# Patient Record
Sex: FEMALE | Race: BLACK OR AFRICAN AMERICAN | Employment: FULL TIME | URBAN - METROPOLITAN AREA
[De-identification: names, ages, dates, MRNs, and addresses within clinical notes are randomized per-mention and may not be internally consistent; named-entity substitution may affect disease eponyms.]

---

## 2017-02-13 ENCOUNTER — OFFICE VISIT (OUTPATIENT)
Dept: OBGYN CLINIC | Facility: CLINIC | Age: 57
End: 2017-02-13

## 2017-02-13 VITALS
HEIGHT: 67 IN | DIASTOLIC BLOOD PRESSURE: 70 MMHG | SYSTOLIC BLOOD PRESSURE: 108 MMHG | RESPIRATION RATE: 16 BRPM | HEART RATE: 75 BPM | BODY MASS INDEX: 30.45 KG/M2 | WEIGHT: 194 LBS | TEMPERATURE: 99 F

## 2017-02-13 DIAGNOSIS — M75.81 ROTATOR CUFF TENDINITIS, RIGHT: ICD-10-CM

## 2017-02-13 DIAGNOSIS — Z00.01 ENCOUNTER FOR PREVENTATIVE ADULT HEALTH CARE EXAM WITH ABNORMAL FINDINGS: Primary | ICD-10-CM

## 2017-02-13 DIAGNOSIS — Z12.31 SCREENING MAMMOGRAM, ENCOUNTER FOR: ICD-10-CM

## 2017-02-13 DIAGNOSIS — Z23 NEED FOR VACCINATION: ICD-10-CM

## 2017-02-13 DIAGNOSIS — E66.09 NON MORBID OBESITY DUE TO EXCESS CALORIES: ICD-10-CM

## 2017-02-13 PROBLEM — N87.0 CERVICAL INTRAEPITHELIAL NEOPLASIA GRADE 1: Status: ACTIVE | Noted: 2017-02-13

## 2017-02-13 PROCEDURE — 90686 IIV4 VACC NO PRSV 0.5 ML IM: CPT | Performed by: FAMILY MEDICINE

## 2017-02-13 PROCEDURE — 99386 PREV VISIT NEW AGE 40-64: CPT | Performed by: FAMILY MEDICINE

## 2017-02-13 PROCEDURE — 90471 IMMUNIZATION ADMIN: CPT | Performed by: FAMILY MEDICINE

## 2017-02-13 PROCEDURE — 90715 TDAP VACCINE 7 YRS/> IM: CPT | Performed by: FAMILY MEDICINE

## 2017-02-13 PROCEDURE — 90472 IMMUNIZATION ADMIN EACH ADD: CPT | Performed by: FAMILY MEDICINE

## 2017-02-13 NOTE — PROGRESS NOTES
HPI:   Deanne Menendez is a 61year old female who presents for a complete physical exam.     Notes right shoulder pain when reaching for things behind for past 3 months. No hx of any injuries in the past per the patient. No hx of grinding or locking.      Angela Hernadez Hypertension Mother    • Stroke Daughter       Social History:     Smoking Status: Never Smoker                      Alcohol Use: No              Occ: [address] : .  Children: 4.  Smoking status: none, Alcohol [04265]    Discussed with patient the following:  -Monthly breast self-exam  -Breast cancer screening/mammograms and clinical breast exams  -Cervical cancer screening/pap smears-to be done with gynecology  -Colon cancer screening/colonoscopy-up to date, ne

## 2017-02-13 NOTE — PATIENT INSTRUCTIONS
Prevention Guidelines, Women Ages 48 to 59  Screening tests and vaccines are an important part of managing your health. Health counseling is essential, too. Below are guidelines for these, for women ages 48 to 59.  Talk with your healthcare provider to ma Lung cancer Adults age 54 to [de-identified] who have smoked Yearly screening in smokers with 30 pack-year history of smoking or who quit within 15 years   Obesity All women in this age group At routine exams   Osteoporosis Women who are postmenopausal Ask your healthc PPSV23: 1 to 2 doses through age 59, or 1 dose at 72 or older (protects against 23 types of pneumococcal bacteria)   Tetanus/diphtheria/pertussis (Td/Tdap) booster All women in this age group Td every 10 years, or a one-time dose of Tdap instead of a Td cindy

## 2017-02-20 ENCOUNTER — APPOINTMENT (OUTPATIENT)
Dept: LAB | Age: 57
End: 2017-02-20
Attending: FAMILY MEDICINE
Payer: COMMERCIAL

## 2017-02-20 DIAGNOSIS — Z00.01 ENCOUNTER FOR PREVENTATIVE ADULT HEALTH CARE EXAM WITH ABNORMAL FINDINGS: ICD-10-CM

## 2017-02-20 DIAGNOSIS — E66.09 NON MORBID OBESITY DUE TO EXCESS CALORIES: ICD-10-CM

## 2017-02-20 LAB
CHOLEST SERPL-MCNC: 169 MG/DL (ref 110–200)
HBA1C MFR BLD: 5.7 % (ref 4–6)
HDLC SERPL-MCNC: 58 MG/DL
LDLC SERPL CALC-MCNC: 99 MG/DL (ref 0–99)
NONHDLC SERPL-MCNC: 111 MG/DL
TRIGL SERPL-MCNC: 62 MG/DL (ref 1–149)

## 2017-02-20 PROCEDURE — 86803 HEPATITIS C AB TEST: CPT

## 2017-02-20 PROCEDURE — 83036 HEMOGLOBIN GLYCOSYLATED A1C: CPT

## 2017-02-20 PROCEDURE — 36415 COLL VENOUS BLD VENIPUNCTURE: CPT

## 2017-02-20 PROCEDURE — 80061 LIPID PANEL: CPT

## 2017-02-21 LAB — HCV AB SERPL QL IA: NONREACTIVE

## 2017-02-27 ENCOUNTER — HOSPITAL ENCOUNTER (OUTPATIENT)
Dept: MAMMOGRAPHY | Age: 57
Discharge: HOME | End: 2017-02-27
Attending: FAMILY MEDICINE
Payer: COMMERCIAL

## 2017-02-27 DIAGNOSIS — Z12.31 SCREENING MAMMOGRAM, ENCOUNTER FOR: ICD-10-CM

## 2017-02-27 PROCEDURE — 77067 SCR MAMMO BI INCL CAD: CPT

## 2017-03-08 ENCOUNTER — TELEPHONE (OUTPATIENT)
Dept: OBGYN CLINIC | Facility: CLINIC | Age: 57
End: 2017-03-08

## 2017-03-08 DIAGNOSIS — H57.11 ACUTE RIGHT EYE PAIN: Primary | ICD-10-CM

## 2017-03-08 NOTE — TELEPHONE ENCOUNTER
Patient reports acute onset right eye pain after removing her contact due to severe allergy symptoms in her eye. Describes foreign body sensation and no relief after using Visine and flushing with saline. Also can not see anything in her eye.  No sudden vis

## 2017-07-15 ENCOUNTER — MED REC SCAN ONLY (OUTPATIENT)
Dept: FAMILY MEDICINE CLINIC | Facility: CLINIC | Age: 57
End: 2017-07-15

## 2017-10-31 ENCOUNTER — TELEPHONE (OUTPATIENT)
Dept: OBGYN CLINIC | Facility: CLINIC | Age: 57
End: 2017-10-31

## 2017-10-31 DIAGNOSIS — T75.3XXD MOTION SICKNESS, SUBSEQUENT ENCOUNTER: Primary | ICD-10-CM

## 2017-10-31 RX ORDER — SCOLOPAMINE TRANSDERMAL SYSTEM 1 MG/1
1 PATCH, EXTENDED RELEASE TRANSDERMAL
Qty: 7 PATCH | Refills: 0 | Status: SHIPPED | OUTPATIENT
Start: 2017-10-31 | End: 2018-06-04 | Stop reason: ALTCHOICE

## 2017-10-31 NOTE — TELEPHONE ENCOUNTER
Pt states she will be going on a cruise and usually gets motion sickness and want to know if she can get an rx for a patch to her her unsure of name

## 2018-06-04 ENCOUNTER — OFFICE VISIT (OUTPATIENT)
Dept: OBGYN CLINIC | Facility: CLINIC | Age: 58
End: 2018-06-04

## 2018-06-04 ENCOUNTER — HOSPITAL ENCOUNTER (OUTPATIENT)
Dept: GENERAL RADIOLOGY | Age: 58
Discharge: HOME | End: 2018-06-04
Attending: FAMILY MEDICINE
Payer: COMMERCIAL

## 2018-06-04 ENCOUNTER — OFFICE VISIT (OUTPATIENT)
Dept: FAMILY MEDICINE CLINIC | Facility: CLINIC | Age: 58
End: 2018-06-04

## 2018-06-04 ENCOUNTER — APPOINTMENT (OUTPATIENT)
Dept: LAB | Facility: REFERENCE LAB | Age: 58
End: 2018-06-04
Attending: FAMILY MEDICINE
Payer: COMMERCIAL

## 2018-06-04 VITALS
HEIGHT: 66 IN | BODY MASS INDEX: 30.37 KG/M2 | WEIGHT: 189 LBS | OXYGEN SATURATION: 98 % | SYSTOLIC BLOOD PRESSURE: 112 MMHG | HEART RATE: 84 BPM | DIASTOLIC BLOOD PRESSURE: 68 MMHG

## 2018-06-04 VITALS
WEIGHT: 189.81 LBS | DIASTOLIC BLOOD PRESSURE: 86 MMHG | BODY MASS INDEX: 30.51 KG/M2 | SYSTOLIC BLOOD PRESSURE: 120 MMHG | HEIGHT: 66 IN

## 2018-06-04 DIAGNOSIS — M25.552 BILATERAL HIP PAIN: ICD-10-CM

## 2018-06-04 DIAGNOSIS — Z00.01 ENCOUNTER FOR ROUTINE ADULT HEALTH EXAMINATION WITH ABNORMAL FINDINGS: ICD-10-CM

## 2018-06-04 DIAGNOSIS — Z01.419 WOMEN'S ANNUAL ROUTINE GYNECOLOGICAL EXAMINATION: Primary | ICD-10-CM

## 2018-06-04 DIAGNOSIS — Z00.01 ENCOUNTER FOR ROUTINE ADULT HEALTH EXAMINATION WITH ABNORMAL FINDINGS: Primary | ICD-10-CM

## 2018-06-04 DIAGNOSIS — M25.551 BILATERAL HIP PAIN: ICD-10-CM

## 2018-06-04 DIAGNOSIS — Z12.4 ROUTINE CERVICAL SMEAR: ICD-10-CM

## 2018-06-04 DIAGNOSIS — Z12.11 COLON CANCER SCREENING: ICD-10-CM

## 2018-06-04 PROCEDURE — 99396 PREV VISIT EST AGE 40-64: CPT | Performed by: OBSTETRICS & GYNECOLOGY

## 2018-06-04 PROCEDURE — 88175 CYTOPATH C/V AUTO FLUID REDO: CPT | Performed by: OBSTETRICS & GYNECOLOGY

## 2018-06-04 PROCEDURE — 87624 HPV HI-RISK TYP POOLED RSLT: CPT | Performed by: OBSTETRICS & GYNECOLOGY

## 2018-06-04 PROCEDURE — 99396 PREV VISIT EST AGE 40-64: CPT | Performed by: FAMILY MEDICINE

## 2018-06-04 PROCEDURE — 36415 COLL VENOUS BLD VENIPUNCTURE: CPT

## 2018-06-04 PROCEDURE — 73522 X-RAY EXAM HIPS BI 3-4 VIEWS: CPT | Performed by: FAMILY MEDICINE

## 2018-06-04 PROCEDURE — 83036 HEMOGLOBIN GLYCOSYLATED A1C: CPT

## 2018-06-04 RX ORDER — IBUPROFEN 200 MG
400 TABLET ORAL EVERY 6 HOURS PRN
Status: ON HOLD | COMMUNITY
End: 2020-12-31

## 2018-06-04 NOTE — PROGRESS NOTES
GYN ANNUAL    2018  1:45 PM    CC:Patient presents for yearly visit    HPI: Patient is a 60year old  LMP  here for annual gyn exam, due for PAP, mammogram and due for colonoscopy. No pelvic pain. No abnormal vaginal discharge or bleeding. Drug use: No    Sexual activity: Not on file     Other Topics Concern   None on file     Social History Narrative   None on file       ROS:     Review of Systems:  General: no complaints  Eyes: no complaints  Respiratory: no complaints  Cardiovascular:

## 2018-06-04 NOTE — PROGRESS NOTES
HPI:   Dearl Asa Torres is a 60year old female who presents for a complete physical exam.     Has had pain in both hips down to her knees for the past year. Takes Ibuprofen when the pain is severe.  Sometimes she thinks sleep positions make it worse but no cervix (lgsil) 2010   • Post-menopausal 2012   • Post-menopausal bleeding 2015      Past Surgical History:  2015: COLPOSCOPY, CERVIX W/UPPER ADJACENT VAGINA; W/*  2012: COLPOSCOPY, CERVIX W/UPPER ADJACENT VAGINA; W/*  08/13/2015: HYSTEROSCOPY      Comment: complete physical exam.  Encounter for routine adult health examination with abnormal findings  (primary encounter diagnosis)  Bilateral hip pain    Orders Placed This Encounter      Hemoglobin A1C (Glycohemoglobin) [E]    Check bilateral hip x-ray for anya

## 2018-06-05 DIAGNOSIS — M16.0 OSTEOARTHRITIS OF BOTH HIPS, UNSPECIFIED OSTEOARTHRITIS TYPE: Primary | ICD-10-CM

## 2018-07-23 ENCOUNTER — HOSPITAL ENCOUNTER (OUTPATIENT)
Dept: MAMMOGRAPHY | Age: 58
Discharge: HOME | End: 2018-07-23
Attending: OBSTETRICS & GYNECOLOGY
Payer: COMMERCIAL

## 2018-07-23 DIAGNOSIS — Z01.419 WOMEN'S ANNUAL ROUTINE GYNECOLOGICAL EXAMINATION: ICD-10-CM

## 2018-07-23 PROCEDURE — 77067 SCR MAMMO BI INCL CAD: CPT | Performed by: OBSTETRICS & GYNECOLOGY

## 2018-07-23 PROCEDURE — 77063 BREAST TOMOSYNTHESIS BI: CPT | Performed by: OBSTETRICS & GYNECOLOGY

## 2018-08-14 ENCOUNTER — TELEPHONE (OUTPATIENT)
Dept: FAMILY MEDICINE CLINIC | Facility: CLINIC | Age: 58
End: 2018-08-14

## 2018-08-14 NOTE — TELEPHONE ENCOUNTER
Patient needing Orthopedic referral sent to [facility] office  Fax over [phone] Also patient needing Copy of Xray pictures to bring with to her appointment

## 2018-08-14 NOTE — TELEPHONE ENCOUNTER
Informed pt that her referral to Dr. Malgorzata Osuna is in her chart and Dr. Chelo Simpson office can retrieve that from her chart. Let pt know that a copy of her CD would need to be signed for by the pt and given to Dr. Malgorzata Osuna. We can not request that.  Gave pt # for radio

## 2018-10-03 ENCOUNTER — HOSPITAL ENCOUNTER (OUTPATIENT)
Dept: MRI IMAGING | Facility: HOSPITAL | Age: 58
Discharge: HOME | End: 2018-10-03
Attending: PHYSICIAN ASSISTANT
Payer: COMMERCIAL

## 2018-10-03 DIAGNOSIS — M87.059: ICD-10-CM

## 2018-10-03 DIAGNOSIS — M16.11 OSTEOARTHRITIS OF RIGHT HIP: ICD-10-CM

## 2018-10-03 DIAGNOSIS — M25.551 RIGHT HIP PAIN: ICD-10-CM

## 2018-10-03 PROCEDURE — 73721 MRI JNT OF LWR EXTRE W/O DYE: CPT | Performed by: PHYSICIAN ASSISTANT

## 2019-03-13 ENCOUNTER — TELEPHONE (OUTPATIENT)
Dept: FAMILY MEDICINE CLINIC | Facility: CLINIC | Age: 59
End: 2019-03-13

## 2019-11-11 ENCOUNTER — OFFICE VISIT (OUTPATIENT)
Dept: FAMILY MEDICINE CLINIC | Facility: CLINIC | Age: 59
End: 2019-11-11

## 2019-11-11 VITALS
SYSTOLIC BLOOD PRESSURE: 126 MMHG | OXYGEN SATURATION: 98 % | HEIGHT: 66 IN | WEIGHT: 192 LBS | HEART RATE: 76 BPM | BODY MASS INDEX: 30.86 KG/M2 | DIASTOLIC BLOOD PRESSURE: 80 MMHG

## 2019-11-11 DIAGNOSIS — M16.0 OSTEOARTHRITIS OF BOTH HIPS, UNSPECIFIED OSTEOARTHRITIS TYPE: ICD-10-CM

## 2019-11-11 DIAGNOSIS — Z12.31 SCREENING MAMMOGRAM, ENCOUNTER FOR: ICD-10-CM

## 2019-11-11 DIAGNOSIS — Z23 NEED FOR VACCINATION: ICD-10-CM

## 2019-11-11 DIAGNOSIS — R10.2 SUPRAPUBIC PAIN, ACUTE: ICD-10-CM

## 2019-11-11 DIAGNOSIS — Z12.11 COLON CANCER SCREENING: ICD-10-CM

## 2019-11-11 DIAGNOSIS — Z00.01 ENCOUNTER FOR ROUTINE ADULT HEALTH EXAMINATION WITH ABNORMAL FINDINGS: Primary | ICD-10-CM

## 2019-11-11 PROCEDURE — 90686 IIV4 VACC NO PRSV 0.5 ML IM: CPT | Performed by: FAMILY MEDICINE

## 2019-11-11 PROCEDURE — 99396 PREV VISIT EST AGE 40-64: CPT | Performed by: FAMILY MEDICINE

## 2019-11-11 PROCEDURE — 90471 IMMUNIZATION ADMIN: CPT | Performed by: FAMILY MEDICINE

## 2019-11-11 RX ORDER — SCOLOPAMINE TRANSDERMAL SYSTEM 1 MG/1
1 PATCH, EXTENDED RELEASE TRANSDERMAL
Qty: 4 PATCH | Refills: 0 | Status: SHIPPED | OUTPATIENT
Start: 2019-11-11 | End: 2019-12-10

## 2019-11-11 NOTE — PROGRESS NOTES
HPI:   Socorro Torres is a 60year old female who presents for a complete physical exam.     Has been having RLQ pain for the past week that is tender to the touch and uncertain if it is due to her hip pain. States walking makes the pain hurt more.   Had MCG/0.5ML Intramuscular Recon Susp Inject 50 mcg into the muscle every 3 (three) months for 2 doses. 1 each 1   • ibuprofen 200 MG Oral Tab Take 400 mg by mouth every 6 (six) hours as needed for Pain.        No Known Allergies   Past Medical History:   Diag or HSM   : Deferred, sees gynecology    EXTREMITIES: no edema  MSK: Normal gait, bilateral log roll testing normal/negative     Cholesterol, Total (mg/dL)   Date Value   02/20/2017 169     HDL Cholesterol (mg/dL)   Date Value   02/20/2017 58     LDL Chol for Tdap once as an adult and Td booster every 10 years  -Need for Zoster vaccine for patients >= 50  -STI screening (GC/Chlamydia/HIV): Not indicated   -Hepatitis C screening for those born between [facility] or high risk: Checked and negative     All

## 2019-11-13 ENCOUNTER — LAB ENCOUNTER (OUTPATIENT)
Dept: LAB | Facility: REFERENCE LAB | Age: 59
End: 2019-11-13
Attending: FAMILY MEDICINE
Payer: COMMERCIAL

## 2019-11-13 DIAGNOSIS — Z00.01 ENCOUNTER FOR ROUTINE ADULT HEALTH EXAMINATION WITH ABNORMAL FINDINGS: ICD-10-CM

## 2019-11-13 PROCEDURE — 80061 LIPID PANEL: CPT

## 2019-11-13 PROCEDURE — 83036 HEMOGLOBIN GLYCOSYLATED A1C: CPT

## 2019-11-13 PROCEDURE — 85025 COMPLETE CBC W/AUTO DIFF WBC: CPT

## 2019-11-13 PROCEDURE — 36415 COLL VENOUS BLD VENIPUNCTURE: CPT

## 2019-11-13 PROCEDURE — 80053 COMPREHEN METABOLIC PANEL: CPT

## 2019-11-18 ENCOUNTER — OFFICE VISIT (OUTPATIENT)
Dept: OBGYN CLINIC | Facility: CLINIC | Age: 59
End: 2019-11-18

## 2019-11-18 VITALS
WEIGHT: 188.5 LBS | HEIGHT: 66 IN | BODY MASS INDEX: 30.29 KG/M2 | DIASTOLIC BLOOD PRESSURE: 80 MMHG | SYSTOLIC BLOOD PRESSURE: 110 MMHG

## 2019-11-18 DIAGNOSIS — Z01.419 WOMEN'S ANNUAL ROUTINE GYNECOLOGICAL EXAMINATION: Primary | ICD-10-CM

## 2019-11-18 DIAGNOSIS — R10.30 LOWER ABDOMINAL PAIN: ICD-10-CM

## 2019-11-18 PROCEDURE — 99214 OFFICE O/P EST MOD 30 MIN: CPT | Performed by: OBSTETRICS & GYNECOLOGY

## 2019-11-18 PROCEDURE — 99396 PREV VISIT EST AGE 40-64: CPT | Performed by: OBSTETRICS & GYNECOLOGY

## 2019-11-18 RX ORDER — ACETAMINOPHEN 500 MG
500 TABLET ORAL EVERY 6 HOURS PRN
Status: ON HOLD | COMMUNITY
End: 2020-12-31

## 2019-11-18 NOTE — PROGRESS NOTES
GYN ANNUAL    11/18/2019  8:49 AM    Patient presents with: Annual: annual exam recent physical exam done by Dr Davi Hazel 11/11/2019  Abdominal Pain: pt c/o lower abdominal pain   .     HPI: Patient is a 60year old [id number] LMP 2012 presents for annual gyn exam Place 1 patch onto the skin every third day.  4 patch 0       Past Medical History:   Diagnosis Date   • Fractured bone 2008    Tail bone    • Low grade squamous intraepith lesion on cytologic smear cervix (lgsil) 2010   • Post-menopausal 2012   • Post-joann in no apparent distress  SKIN: no rashes, no lesions  HEENT: normal  LUNGS: respiration unlabored  CARDIOVASCULAR: no peripheral edema or varicosities, skin warm and dry  BREASTS: bilaterally nontender, no palpable masses, no nipple discharge, no skin thakur

## 2019-11-19 ENCOUNTER — TELEPHONE (OUTPATIENT)
Dept: OBGYN CLINIC | Facility: CLINIC | Age: 59
End: 2019-11-19

## 2019-11-19 ENCOUNTER — HOSPITAL ENCOUNTER (OUTPATIENT)
Age: 59
Discharge: HOME | End: 2019-11-19
Attending: EMERGENCY MEDICINE
Payer: COMMERCIAL

## 2019-11-19 ENCOUNTER — APPOINTMENT (OUTPATIENT)
Dept: ULTRASOUND IMAGING | Facility: HOSPITAL | Age: 59
End: 2019-11-19
Attending: EMERGENCY MEDICINE
Payer: COMMERCIAL

## 2019-11-19 ENCOUNTER — APPOINTMENT (OUTPATIENT)
Dept: CT IMAGING | Facility: HOSPITAL | Age: 59
End: 2019-11-19
Attending: EMERGENCY MEDICINE
Payer: COMMERCIAL

## 2019-11-19 ENCOUNTER — HOSPITAL ENCOUNTER (EMERGENCY)
Facility: HOSPITAL | Age: 59
Discharge: HOME | End: 2019-11-19
Attending: EMERGENCY MEDICINE
Payer: COMMERCIAL

## 2019-11-19 VITALS
WEIGHT: 182 LBS | RESPIRATION RATE: 16 BRPM | OXYGEN SATURATION: 99 % | BODY MASS INDEX: 29.25 KG/M2 | TEMPERATURE: 98 F | DIASTOLIC BLOOD PRESSURE: 80 MMHG | SYSTOLIC BLOOD PRESSURE: 125 MMHG | HEART RATE: 90 BPM | HEIGHT: 66 IN

## 2019-11-19 VITALS
SYSTOLIC BLOOD PRESSURE: 124 MMHG | HEART RATE: 85 BPM | TEMPERATURE: 97 F | OXYGEN SATURATION: 100 % | DIASTOLIC BLOOD PRESSURE: 91 MMHG | RESPIRATION RATE: 18 BRPM

## 2019-11-19 DIAGNOSIS — R10.2 PELVIC PAIN IN FEMALE: Primary | ICD-10-CM

## 2019-11-19 DIAGNOSIS — R10.2 PELVIC PAIN: Primary | ICD-10-CM

## 2019-11-19 PROCEDURE — 99202 OFFICE O/P NEW SF 15 MIN: CPT

## 2019-11-19 PROCEDURE — 99213 OFFICE O/P EST LOW 20 MIN: CPT

## 2019-11-19 PROCEDURE — 76830 TRANSVAGINAL US NON-OB: CPT | Performed by: EMERGENCY MEDICINE

## 2019-11-19 PROCEDURE — 93975 VASCULAR STUDY: CPT | Performed by: EMERGENCY MEDICINE

## 2019-11-19 PROCEDURE — 80048 BASIC METABOLIC PNL TOTAL CA: CPT | Performed by: EMERGENCY MEDICINE

## 2019-11-19 PROCEDURE — 76856 US EXAM PELVIC COMPLETE: CPT | Performed by: EMERGENCY MEDICINE

## 2019-11-19 PROCEDURE — 74177 CT ABD & PELVIS W/CONTRAST: CPT | Performed by: EMERGENCY MEDICINE

## 2019-11-19 PROCEDURE — 96374 THER/PROPH/DIAG INJ IV PUSH: CPT

## 2019-11-19 PROCEDURE — 81001 URINALYSIS AUTO W/SCOPE: CPT

## 2019-11-19 PROCEDURE — 99285 EMERGENCY DEPT VISIT HI MDM: CPT

## 2019-11-19 PROCEDURE — 85025 COMPLETE CBC W/AUTO DIFF WBC: CPT | Performed by: EMERGENCY MEDICINE

## 2019-11-19 PROCEDURE — 81001 URINALYSIS AUTO W/SCOPE: CPT | Performed by: EMERGENCY MEDICINE

## 2019-11-19 RX ORDER — IBUPROFEN 600 MG/1
600 TABLET ORAL ONCE
Status: COMPLETED | OUTPATIENT
Start: 2019-11-19 | End: 2019-11-19

## 2019-11-19 RX ORDER — MORPHINE SULFATE 4 MG/ML
4 INJECTION, SOLUTION INTRAMUSCULAR; INTRAVENOUS ONCE
Status: DISCONTINUED | OUTPATIENT
Start: 2019-11-19 | End: 2019-11-19

## 2019-11-19 RX ORDER — HYDROCODONE BITARTRATE AND ACETAMINOPHEN 5; 325 MG/1; MG/1
1-2 TABLET ORAL EVERY 6 HOURS PRN
Qty: 10 TABLET | Refills: 0 | Status: SHIPPED | OUTPATIENT
Start: 2019-11-19 | End: 2019-11-26

## 2019-11-19 NOTE — ED INITIAL ASSESSMENT (HPI)
Pelvic pain worsening for past 2 weeks, denies vaginal bleeding or discharge.  Reports urinary frequency and urgency

## 2019-11-19 NOTE — ED INITIAL ASSESSMENT (HPI)
Pt states having pelvic pain for the the past 2 weeks. Pt states last night has become unbearable. Pt states painful to press on area. Pt states painful to walk around. Pt states less painful when not moving. Pt denies fevers.

## 2019-11-19 NOTE — ED PROVIDER NOTES
Patient Seen in: [address] [facility] [address]      History   Patient presents with:  Pelvic Pain    Stated Complaint: PELVIC PAIN    HPI    60-year-old female with history of fibroids and hip osteoarthritis presenting for evaluation of pe above.    Physical Exam     ED Triage Vitals [11/19/19 1142]   BP (!) 124/91   Pulse 85   Resp 18   Temp 97.4 °F (36.3 °C)   Temp src Oral   SpO2 100 %   O2 Device None (Room air)       Current:BP (!) 124/91   Pulse 85   Temp 97.4 °F (36.3 °C) (Oral)   Res

## 2019-11-19 NOTE — TELEPHONE ENCOUNTER
Patient requesting an call back in regard to Severe Pelvic pain she is having states will like to know if an stronger medication can be given

## 2019-11-19 NOTE — ED NOTES
Pt to be reassessed in [facility] ED, Per MD recommendation for pelvic pain. Pt confirmed understanding and would be transferring self via car.

## 2019-11-19 NOTE — TELEPHONE ENCOUNTER
Per pt, her pelvic pain has worsened and sounded as if she was in a great deal of pain. She was advised to have an US done per Dr. Suzette Johnson. She is currently feeing nauseated & can not wait for US. Encouraged pt to come in to UC or she can also go to an ER.  P

## 2019-11-20 NOTE — ED NOTES
New orders received. Ice water given for [employer]. Pt reports she feels the urge to void. Pt aware not to void until after US.

## 2019-11-20 NOTE — ED PROVIDER NOTES
Patient Seen in: [facility] Emergency Department      History   Patient presents with:  Pelvic Pain    Stated Complaint:     HPI    Patient is a 27-year-old female who presents to the emergency department with a chief complaint of pelvic pain.   Pa Constitutional:       Appearance: She is well-developed. HENT:      Head: Normocephalic and atraumatic. Eyes:      Conjunctiva/sclera: Conjunctivae normal.      Pupils: Pupils are equal, round, and reactive to light.    Neck:      Musculoskeletal: Neck Pelvic pain in female  (primary encounter diagnosis)    Disposition:  Discharge  11/19/2019  9:34 pm    Follow-up:  Guilherme Tate, [address] [facility],[address] in 1 day          Medications Prescribed:  Current Dis

## 2019-11-26 ENCOUNTER — HOSPITAL ENCOUNTER (OUTPATIENT)
Dept: MAMMOGRAPHY | Age: 59
Discharge: HOME | End: 2019-11-26
Attending: FAMILY MEDICINE
Payer: COMMERCIAL

## 2019-11-26 DIAGNOSIS — Z12.31 SCREENING MAMMOGRAM, ENCOUNTER FOR: ICD-10-CM

## 2019-11-26 PROCEDURE — 77063 BREAST TOMOSYNTHESIS BI: CPT | Performed by: FAMILY MEDICINE

## 2019-11-26 PROCEDURE — 77067 SCR MAMMO BI INCL CAD: CPT | Performed by: FAMILY MEDICINE

## 2019-12-05 ENCOUNTER — TELEPHONE (OUTPATIENT)
Dept: FAMILY MEDICINE CLINIC | Facility: CLINIC | Age: 59
End: 2019-12-05

## 2019-12-06 NOTE — TELEPHONE ENCOUNTER
Left VM for patient as she did not answer. She needs to see me in the office for a follow-up. If she has a hard time finding an appointment that works, you can offer her an Quadra Quad[facility] [phone] slot.

## 2019-12-09 NOTE — TELEPHONE ENCOUNTER
Pt scheduled with AS for tomorrow. Pt was just unsure how soon AS wanted to see her based on mychart msg. Pt verbalized understanding and agrees with POC.

## 2019-12-10 ENCOUNTER — OFFICE VISIT (OUTPATIENT)
Dept: FAMILY MEDICINE CLINIC | Facility: CLINIC | Age: 59
End: 2019-12-10

## 2019-12-10 VITALS
SYSTOLIC BLOOD PRESSURE: 136 MMHG | BODY MASS INDEX: 30.37 KG/M2 | OXYGEN SATURATION: 98 % | HEART RATE: 89 BPM | WEIGHT: 189 LBS | HEIGHT: 66 IN | DIASTOLIC BLOOD PRESSURE: 84 MMHG

## 2019-12-10 DIAGNOSIS — R10.30 LOWER ABDOMINAL PAIN: Primary | ICD-10-CM

## 2019-12-10 DIAGNOSIS — R91.1 PULMONARY NODULE: ICD-10-CM

## 2019-12-10 PROCEDURE — 99213 OFFICE O/P EST LOW 20 MIN: CPT | Performed by: FAMILY MEDICINE

## 2019-12-10 NOTE — PROGRESS NOTES
CC:  Patient presents with:  Test Results      HPI: 60year old female here to follow-up on CT abdomen/pelvis done in the ER for acute pelvic pain 11/19.   Developed RLQ pain in early November and saw Dr. Dick Scott as well and was told she did not think the p on file      Transportation needs:        Medical: Not on file        Non-medical: Not on file    Tobacco Use      Smoking status: Never Smoker      Smokeless tobacco: Never Used    Substance and Sexual Activity      Alcohol use:  Yes        Alcohol/week: 0 guarding, no rebound, no CVA tenderness   Dermatologic:  No rashes or lesions    Assessment and Plan: 60year old female her to follow-up on now resolved lower abdominal/pelvic pain.     1. Lower abdominal pain    - Reviewed unremarkable CT abdomen/pelvis e

## 2019-12-18 ENCOUNTER — HOSPITAL ENCOUNTER (OUTPATIENT)
Dept: CT IMAGING | Facility: HOSPITAL | Age: 59
Discharge: HOME | End: 2019-12-18
Attending: FAMILY MEDICINE
Payer: COMMERCIAL

## 2019-12-18 DIAGNOSIS — R91.1 PULMONARY NODULE: ICD-10-CM

## 2019-12-18 PROCEDURE — 71260 CT THORAX DX C+: CPT | Performed by: FAMILY MEDICINE

## 2019-12-20 DIAGNOSIS — R91.8 PULMONARY NODULES: ICD-10-CM

## 2019-12-20 DIAGNOSIS — E04.1 THYROID NODULE: Primary | ICD-10-CM

## 2020-02-05 ENCOUNTER — HOSPITAL ENCOUNTER (OUTPATIENT)
Dept: ULTRASOUND IMAGING | Age: 60
Discharge: HOME | End: 2020-02-05
Attending: FAMILY MEDICINE
Payer: COMMERCIAL

## 2020-02-05 DIAGNOSIS — E04.2 MULTIPLE THYROID NODULES: Primary | ICD-10-CM

## 2020-02-05 DIAGNOSIS — E04.1 THYROID NODULE: ICD-10-CM

## 2020-02-05 PROCEDURE — 76536 US EXAM OF HEAD AND NECK: CPT | Performed by: FAMILY MEDICINE

## 2020-02-10 ENCOUNTER — TELEPHONE (OUTPATIENT)
Dept: FAMILY MEDICINE CLINIC | Facility: CLINIC | Age: 60
End: 2020-02-10

## 2020-02-10 RX ORDER — LORAZEPAM 0.5 MG/1
0.5 TABLET ORAL EVERY 2 HOUR PRN
Qty: 5 TABLET | Refills: 0 | Status: SHIPPED | OUTPATIENT
Start: 2020-02-10 | End: 2020-11-23 | Stop reason: ALTCHOICE

## 2020-02-10 NOTE — TELEPHONE ENCOUNTER
Spoke with patient about ultrasound results and need for biopsies of nodules based on size. She is nervous but willing to go through with it, though did recommend Lorazepam 0.5-1 mg 30 minutes to an hour prior to biopsies, which was sent to the pharmacy.  A

## 2020-03-05 NOTE — IMAGING NOTE
Spoke with patient regarding arrival time and med review. Instructed to hold anticoagulants, NSAIDs, and Vitamin E/ fish oil supplements for x5 days.

## 2020-03-09 ENCOUNTER — HOSPITAL ENCOUNTER (OUTPATIENT)
Dept: ULTRASOUND IMAGING | Facility: HOSPITAL | Age: 60
Discharge: HOME | End: 2020-03-09
Attending: FAMILY MEDICINE
Payer: COMMERCIAL

## 2020-03-09 VITALS
OXYGEN SATURATION: 98 % | DIASTOLIC BLOOD PRESSURE: 77 MMHG | RESPIRATION RATE: 16 BRPM | HEART RATE: 74 BPM | SYSTOLIC BLOOD PRESSURE: 123 MMHG

## 2020-03-09 DIAGNOSIS — E04.2 MULTIPLE THYROID NODULES: ICD-10-CM

## 2020-03-09 PROCEDURE — 88172 CYTP DX EVAL FNA 1ST EA SITE: CPT | Performed by: FAMILY MEDICINE

## 2020-03-09 PROCEDURE — 10006 FNA BX W/US GDN EA ADDL: CPT | Performed by: FAMILY MEDICINE

## 2020-03-09 PROCEDURE — 88177 CYTP FNA EVAL EA ADDL: CPT | Performed by: FAMILY MEDICINE

## 2020-03-09 PROCEDURE — 10005 FNA BX W/US GDN 1ST LES: CPT | Performed by: FAMILY MEDICINE

## 2020-03-09 PROCEDURE — 88173 CYTOPATH EVAL FNA REPORT: CPT | Performed by: FAMILY MEDICINE

## 2020-03-09 NOTE — IMAGING NOTE
1408: Pt arrived to ultrasound room # 1     1426: Scans taken by ALVARO CRAWFORD [facility] ultrasound  sonographer     5986: History taken. 1420: Procedure explained questions answered. 1420: Consent verified and obtained.     1440:  scans completed by [employer]

## 2020-10-29 ENCOUNTER — TELEPHONE (OUTPATIENT)
Dept: FAMILY MEDICINE CLINIC | Facility: CLINIC | Age: 60
End: 2020-10-29

## 2020-10-29 DIAGNOSIS — M16.0 OSTEOARTHRITIS OF BOTH HIPS, UNSPECIFIED OSTEOARTHRITIS TYPE: Primary | ICD-10-CM

## 2020-10-30 ENCOUNTER — TELEPHONE (OUTPATIENT)
Dept: OBGYN CLINIC | Facility: CLINIC | Age: 60
End: 2020-10-30

## 2020-10-30 NOTE — TELEPHONE ENCOUNTER
Pt called and informed referral in the Epic system, provided Dr. Mariana Cantu office number and address. Pt verbalized understanding.

## 2020-10-30 NOTE — TELEPHONE ENCOUNTER
New referral placed. Please remind patient to schedule her physical with me as she is due next month.

## 2020-11-04 ENCOUNTER — TELEPHONE (OUTPATIENT)
Dept: FAMILY MEDICINE CLINIC | Facility: CLINIC | Age: 60
End: 2020-11-04

## 2020-11-04 ENCOUNTER — IMMUNIZATION (OUTPATIENT)
Dept: FAMILY MEDICINE CLINIC | Facility: CLINIC | Age: 60
End: 2020-11-04

## 2020-11-04 DIAGNOSIS — M16.0 OSTEOARTHRITIS OF BOTH HIPS, UNSPECIFIED OSTEOARTHRITIS TYPE: Primary | ICD-10-CM

## 2020-11-04 DIAGNOSIS — Z23 NEED FOR VACCINATION: ICD-10-CM

## 2020-11-04 PROCEDURE — 90471 IMMUNIZATION ADMIN: CPT | Performed by: FAMILY MEDICINE

## 2020-11-04 PROCEDURE — 90686 IIV4 VACC NO PRSV 0.5 ML IM: CPT | Performed by: FAMILY MEDICINE

## 2020-11-04 NOTE — TELEPHONE ENCOUNTER
Late entry:    Pt reporting needing another MD referral b/c Dr Dorcus Gitelman does not accept patients who refuse blood transfusions

## 2020-11-04 NOTE — TELEPHONE ENCOUNTER
I do not know if Dr. Nic Paiz is okay with patients not accepting blood transfusions but I have referred her there and she can call to inquire.

## 2020-11-05 ENCOUNTER — TELEPHONE (OUTPATIENT)
Dept: ORTHOPEDICS CLINIC | Facility: CLINIC | Age: 60
End: 2020-11-05

## 2020-11-05 NOTE — TELEPHONE ENCOUNTER
Pt would like to make an appt with Dr Jeanette Parsons and anticipates a need for a hip surgery. Patient would refuse a blood transfusion, and would have it noted on the consent for surgery.  Before making an appt would like to make sure that Dr Jeanette Parsons would be agr

## 2020-11-09 NOTE — TELEPHONE ENCOUNTER
I would be willing to do the surgery as long as the patient is not anemic preoperatively, and she is willing to accept the risk that comes with a transfusion refusal.  This assumes, of course, that the patient needs surgery.   I have no opinion on that havramiro

## 2020-11-23 ENCOUNTER — HOSPITAL ENCOUNTER (OUTPATIENT)
Dept: GENERAL RADIOLOGY | Facility: HOSPITAL | Age: 60
Discharge: HOME | End: 2020-11-23
Attending: ORTHOPAEDIC SURGERY
Payer: COMMERCIAL

## 2020-11-23 ENCOUNTER — OFFICE VISIT (OUTPATIENT)
Dept: ORTHOPEDICS CLINIC | Facility: CLINIC | Age: 60
End: 2020-11-23

## 2020-11-23 DIAGNOSIS — M25.559 HIP PAIN: Primary | ICD-10-CM

## 2020-11-23 DIAGNOSIS — M16.0 PRIMARY OSTEOARTHRITIS OF BOTH HIPS: ICD-10-CM

## 2020-11-23 DIAGNOSIS — M25.559 HIP PAIN: ICD-10-CM

## 2020-11-23 PROCEDURE — 73523 X-RAY EXAM HIPS BI 5/> VIEWS: CPT | Performed by: ORTHOPAEDIC SURGERY

## 2020-11-23 PROCEDURE — 99244 OFF/OP CNSLTJ NEW/EST MOD 40: CPT | Performed by: ORTHOPAEDIC SURGERY

## 2020-11-23 NOTE — PROGRESS NOTES
NURSING INTAKE COMMENTS: Patient presents with:  Consult: bilateral hip pain right hip worse ,pain can reach a 9/10 x 2 years       HPI: This 63year old female presents today with complaints of bilateral hip pain.   Is been present and worsening for a numb standard drinks      Drug use: No      Sexual activity: Not on file       Review of Systems:  GENERAL: feels generally well, no significant weight loss or weight gain  SKIN: no ulcerated or worrisome skin lesions  EYES:denies blurred vision or double visio followed by left total hip replacement. We discussed the risks and indications for surgery as well as the common possible complications.   Our discussion included, but was not limited to the potential risks of anesthetic complication, infection, bleeding,

## 2020-11-30 ENCOUNTER — TELEPHONE (OUTPATIENT)
Dept: ORTHOPEDICS CLINIC | Facility: CLINIC | Age: 60
End: 2020-11-30

## 2020-11-30 DIAGNOSIS — M16.11 PRIMARY OSTEOARTHRITIS OF RIGHT HIP: Primary | ICD-10-CM

## 2020-11-30 NOTE — TELEPHONE ENCOUNTER
Called patient back, she is ready to proceed with surgery. Patient understands that Shi Mejia will be contacting her.

## 2020-12-02 ENCOUNTER — HOSPITAL ENCOUNTER (OUTPATIENT)
Dept: MAMMOGRAPHY | Age: 60
Discharge: HOME | End: 2020-12-02
Attending: FAMILY MEDICINE
Payer: COMMERCIAL

## 2020-12-02 ENCOUNTER — LAB ENCOUNTER (OUTPATIENT)
Dept: LAB | Facility: REFERENCE LAB | Age: 60
End: 2020-12-02
Attending: FAMILY MEDICINE
Payer: COMMERCIAL

## 2020-12-02 ENCOUNTER — OFFICE VISIT (OUTPATIENT)
Dept: FAMILY MEDICINE CLINIC | Facility: CLINIC | Age: 60
End: 2020-12-02

## 2020-12-02 VITALS
HEIGHT: 67 IN | WEIGHT: 195 LBS | SYSTOLIC BLOOD PRESSURE: 118 MMHG | BODY MASS INDEX: 30.61 KG/M2 | OXYGEN SATURATION: 98 % | HEART RATE: 73 BPM | DIASTOLIC BLOOD PRESSURE: 72 MMHG

## 2020-12-02 DIAGNOSIS — R91.1 PULMONARY NODULE: ICD-10-CM

## 2020-12-02 DIAGNOSIS — E04.2 MULTIPLE THYROID NODULES: ICD-10-CM

## 2020-12-02 DIAGNOSIS — Z00.01 ENCOUNTER FOR ROUTINE ADULT HEALTH EXAMINATION WITH ABNORMAL FINDINGS: ICD-10-CM

## 2020-12-02 DIAGNOSIS — Z12.31 SCREENING MAMMOGRAM, ENCOUNTER FOR: ICD-10-CM

## 2020-12-02 DIAGNOSIS — Z00.01 ENCOUNTER FOR ROUTINE ADULT HEALTH EXAMINATION WITH ABNORMAL FINDINGS: Primary | ICD-10-CM

## 2020-12-02 DIAGNOSIS — Z01.818 PREOPERATIVE TESTING: ICD-10-CM

## 2020-12-02 DIAGNOSIS — Z12.11 COLON CANCER SCREENING: ICD-10-CM

## 2020-12-02 DIAGNOSIS — M16.0 OSTEOARTHRITIS OF BOTH HIPS, UNSPECIFIED OSTEOARTHRITIS TYPE: ICD-10-CM

## 2020-12-02 PROCEDURE — 83036 HEMOGLOBIN GLYCOSYLATED A1C: CPT

## 2020-12-02 PROCEDURE — 3078F DIAST BP <80 MM HG: CPT | Performed by: FAMILY MEDICINE

## 2020-12-02 PROCEDURE — 87086 URINE CULTURE/COLONY COUNT: CPT

## 2020-12-02 PROCEDURE — 77063 BREAST TOMOSYNTHESIS BI: CPT | Performed by: FAMILY MEDICINE

## 2020-12-02 PROCEDURE — 3008F BODY MASS INDEX DOCD: CPT | Performed by: FAMILY MEDICINE

## 2020-12-02 PROCEDURE — 87641 MR-STAPH DNA AMP PROBE: CPT

## 2020-12-02 PROCEDURE — 80061 LIPID PANEL: CPT

## 2020-12-02 PROCEDURE — 77067 SCR MAMMO BI INCL CAD: CPT | Performed by: FAMILY MEDICINE

## 2020-12-02 PROCEDURE — 81001 URINALYSIS AUTO W/SCOPE: CPT

## 2020-12-02 PROCEDURE — 85730 THROMBOPLASTIN TIME PARTIAL: CPT

## 2020-12-02 PROCEDURE — 85025 COMPLETE CBC W/AUTO DIFF WBC: CPT

## 2020-12-02 PROCEDURE — 3074F SYST BP LT 130 MM HG: CPT | Performed by: FAMILY MEDICINE

## 2020-12-02 PROCEDURE — 99396 PREV VISIT EST AGE 40-64: CPT | Performed by: FAMILY MEDICINE

## 2020-12-02 PROCEDURE — 80053 COMPREHEN METABOLIC PANEL: CPT

## 2020-12-02 PROCEDURE — 85610 PROTHROMBIN TIME: CPT

## 2020-12-02 PROCEDURE — 36415 COLL VENOUS BLD VENIPUNCTURE: CPT

## 2020-12-02 NOTE — PROGRESS NOTES
HPI:   Alex Vernon is a 63year old female who presents for a complete physical exam.   Patient presents with:  Physical  Pre-Op Exam: having surgery for right hip replacement- needs order for EKG,Chest xray, cbc,cmp, ua, urine culture mrsa pt and ptt- chronic (right>left) hip pain         Current Outpatient Medications   Medication Sig Dispense Refill   • Cholecalciferol (VITAMIN D-3 OR) Take by mouth. • Glucosamine-Chondroitin (GLUCOSAMINE CHONDR COMPLEX OR) Take by mouth.      • acetaminophen 500 M in no apparent distress   SKIN: no rashes, no suspicious lesions  HEENT: atraumatic, normocephalic, throat clear; normal dentition  EYES: PERRLA, EOMI, conjunctiva are clear  NECK: supple, no adenopathy or thyroid masses   BREAST: no dominant or suspicious cancer screening/pap smears  -Colon cancer screening/colonoscopy  -Adequate calcium and Vitamin D intake to prevent osteoporosis  -Bone density screening for osteopenia/osteoporosis  -Healthy diet including adequate intake of vegetables and fruits, appropr

## 2020-12-02 NOTE — PATIENT INSTRUCTIONS
Prevention Guidelines, Women Ages 48 to 59  Screening tests and vaccines are an important part of managing your health. A screening test is done to find possible disorders or diseases in people who don't have any symptoms.  The goal is to find a disease e hysterectomy Pap test every 3 years or Pap test with human papillomavirus (HPV) test every 5 years   Chlamydia Women who are sexually active and at increased risk for infection At yearly routine exams   Colorectal cancer All women at average risk in this a information.    Obesity All women in this age group At yearly routine exams   Osteoporosis Women who are postmenopausal Talk with your healthcare provider   Syphilis Women at increased risk for infection At routine exams; talk with your healthcare provider years, or a 1-time dose of Tdap instead of a Td booster after age 25, then Td every 10 years   Zoster (Shingles) All women ages 48 and older 2 vaccines are available:     · Recombinant zoster vaccine (RZV; Shingrix) is recommended as the preferred shingles

## 2020-12-03 NOTE — TELEPHONE ENCOUNTER
Type of surgery: Right LISA  Date:12/29/2020  Location:[facility]  Medical Clearance:      *Medical:      *Dental:      *Other:  Prior Authorization Status: Pending  Workers Comp:  Dana/Shelbi: Candi Plunkett notified  Checklist:  Other:

## 2020-12-08 ENCOUNTER — TELEPHONE (OUTPATIENT)
Dept: ORTHOPEDICS CLINIC | Facility: CLINIC | Age: 60
End: 2020-12-08

## 2020-12-08 ENCOUNTER — TELEPHONE (OUTPATIENT)
Dept: FAMILY MEDICINE CLINIC | Facility: CLINIC | Age: 60
End: 2020-12-08

## 2020-12-08 NOTE — TELEPHONE ENCOUNTER
Patient is currently scheduled for RIght Hip replacement for 12/29. Patient was seen in offce for pre-op on 12/2. Labs and EKG were performed same day. Can you please review labs and inform us if the patient is cleared for surgery?     Pls advise

## 2020-12-08 NOTE — TELEPHONE ENCOUNTER
Followed up with patient today regarding clearance. Informed her that I reached out to her PCP regarding her labs and EKG. Patient informed me that she is currently scheduled with Dr. Abimael Barger, [address] [place] [address]. on 12/15.  Advised patient to make sure she takes the d

## 2020-12-09 ENCOUNTER — TELEPHONE (OUTPATIENT)
Dept: ORTHOPEDICS CLINIC | Facility: CLINIC | Age: 60
End: 2020-12-09

## 2020-12-09 NOTE — TELEPHONE ENCOUNTER
Per Adin Gipson, wanting to let Sylvester Whitehead know Dr. Lucy Mckeon has cleared patient.  Please advise

## 2020-12-11 ENCOUNTER — HOSPITAL ENCOUNTER (OUTPATIENT)
Dept: CT IMAGING | Facility: HOSPITAL | Age: 60
Discharge: HOME | End: 2020-12-11
Attending: FAMILY MEDICINE
Payer: COMMERCIAL

## 2020-12-11 DIAGNOSIS — R91.8 PULMONARY NODULES: ICD-10-CM

## 2020-12-11 PROCEDURE — 71260 CT THORAX DX C+: CPT | Performed by: FAMILY MEDICINE

## 2020-12-16 NOTE — TELEPHONE ENCOUNTER
Spoke with patient this morning following up. Informed that her dental visit did not go as planned. Infection was detected and patient was referred to specialist. Currently scheduled for Friday Dec. 18th.  Additional Dental Clearance form was faxed to aliyah

## 2020-12-22 ENCOUNTER — TELEPHONE (OUTPATIENT)
Dept: ORTHOPEDICS CLINIC | Facility: CLINIC | Age: 60
End: 2020-12-22

## 2020-12-22 NOTE — TELEPHONE ENCOUNTER
Azeem Chamorro,     Please sign off on form: FMLA  -Highlight the patient and hit \"Chart\" button.   -In Chart Review, w/in the Encounter tab - click 1 time on the Telephone call encounter for 12/8/20 Scroll down the telephone encounter.  -Click \"scan on\" blue Hy
Completed. Thanks.
FMLA completed. No fax number listed.  LM informing pt of form completion, mailing pt copy
FMLA form logged.  Sent [facility] [employer] [facility] [employer] for HIPAA/FCR
Patient called and needed copy of form ASAP.  Sent copy through [facility].
Patient calling for update on FMLA papers, please advise at:[phone],thanks.
Patient calling to request a letter or something to prove that she is having surgery on 12/29/2020. Patient needs to let her employment know before 10-15 days of turnaround time for the [facility] papers. Please update at:[phone],thanks.
Patient is inquiring status on forms.  Please advise
Pt notified that letter sent through [employer] saying she is scheduled for sx on 12/29. She had no further concerns.
Returned patients call for form pick. LM she can  copy at Ortho  and a copy was mailed to her home.
Tasking to forms dept
Would like to verify [facility] paperwork was received, patient had . Please call to notify patient.
general

## 2020-12-22 NOTE — TELEPHONE ENCOUNTER
Pt is returning the call to the surgery scheduler regarding a dental form. Pt is scheduled for surgery 12-29-20. Form was sent to the office on 12-19-20. Please call pt on cell phone number.

## 2020-12-23 RX ORDER — AMOXICILLIN 500 MG/1
500 CAPSULE ORAL 3 TIMES DAILY
Status: ON HOLD | COMMUNITY
End: 2020-12-31

## 2020-12-26 ENCOUNTER — LAB ENCOUNTER (OUTPATIENT)
Dept: LAB | Facility: HOSPITAL | Age: 60
End: 2020-12-26
Attending: ORTHOPAEDIC SURGERY
Payer: COMMERCIAL

## 2020-12-26 DIAGNOSIS — Z01.818 PREOP TESTING: ICD-10-CM

## 2020-12-26 DIAGNOSIS — Z00.01 ENCOUNTER FOR ROUTINE ADULT HEALTH EXAMINATION WITH ABNORMAL FINDINGS: ICD-10-CM

## 2020-12-26 PROCEDURE — 93005 ELECTROCARDIOGRAM TRACING: CPT

## 2020-12-26 PROCEDURE — 93010 ELECTROCARDIOGRAM REPORT: CPT | Performed by: FAMILY MEDICINE

## 2020-12-28 NOTE — H&P
[facility] Patient Status:  Surgery Admit - Inpt    1960 MRN [id number]   Location [place] [address] Attending Lashell Munoz, *   Hosp Day # 0 PCP Ligia Mary DO standard drinks      Frequency: Monthly or less    Drug use: No    Allergies/Medications: Allergies: No Known Allergies  No medications prior to admission. Review of Systems:   Pertinent items are noted in HPI.     Physical Exam:   Vital Signs:  Heig perioperative medical or orthopedic complications. She would like to proceed. Surgery has been scheduled pending medical clearance. we will send her for cortisone injection in the left hip to give her some palliation while she has the right hip replaced.

## 2020-12-29 ENCOUNTER — APPOINTMENT (OUTPATIENT)
Dept: GENERAL RADIOLOGY | Facility: HOSPITAL | Age: 60
DRG: 470 | End: 2020-12-29
Attending: PHYSICIAN ASSISTANT
Payer: COMMERCIAL

## 2020-12-29 ENCOUNTER — ANESTHESIA (OUTPATIENT)
Dept: SURGERY | Facility: HOSPITAL | Age: 60
DRG: 470 | End: 2020-12-29
Payer: COMMERCIAL

## 2020-12-29 ENCOUNTER — APPOINTMENT (OUTPATIENT)
Dept: GENERAL RADIOLOGY | Facility: HOSPITAL | Age: 60
DRG: 470 | End: 2020-12-29
Attending: ORTHOPAEDIC SURGERY
Payer: COMMERCIAL

## 2020-12-29 ENCOUNTER — HOSPITAL ENCOUNTER (INPATIENT)
Facility: HOSPITAL | Age: 60
LOS: 2 days | Discharge: HOME HEALTH | DRG: 470 | End: 2020-12-31
Attending: ORTHOPAEDIC SURGERY | Admitting: ORTHOPAEDIC SURGERY
Payer: COMMERCIAL

## 2020-12-29 ENCOUNTER — ANESTHESIA EVENT (OUTPATIENT)
Dept: SURGERY | Facility: HOSPITAL | Age: 60
DRG: 470 | End: 2020-12-29
Payer: COMMERCIAL

## 2020-12-29 DIAGNOSIS — Z01.818 PREOP TESTING: Primary | ICD-10-CM

## 2020-12-29 PROBLEM — M16.11 PRIMARY OSTEOARTHRITIS OF RIGHT HIP: Status: ACTIVE | Noted: 2020-12-29

## 2020-12-29 LAB
DEPRECATED RDW RBC AUTO: 43.8 FL (ref 35.1–46.3)
ERYTHROCYTE [DISTWIDTH] IN BLOOD BY AUTOMATED COUNT: 12.9 % (ref 11–15)
HCT VFR BLD AUTO: 38.1 %
HGB BLD-MCNC: 11.9 G/DL
MCH RBC QN AUTO: 29.2 PG (ref 26–34)
MCHC RBC AUTO-ENTMCNC: 31.2 G/DL (ref 31–37)
MCV RBC AUTO: 93.6 FL
PLATELET # BLD AUTO: 258 10(3)UL (ref 150–450)
RBC # BLD AUTO: 4.07 X10(6)UL
WBC # BLD AUTO: 9.8 X10(3) UL (ref 4–11)

## 2020-12-29 PROCEDURE — 73502 X-RAY EXAM HIP UNI 2-3 VIEWS: CPT | Performed by: PHYSICIAN ASSISTANT

## 2020-12-29 PROCEDURE — 0SR903A REPLACEMENT OF RIGHT HIP JOINT WITH CERAMIC SYNTHETIC SUBSTITUTE, UNCEMENTED, OPEN APPROACH: ICD-10-PCS | Performed by: ORTHOPAEDIC SURGERY

## 2020-12-29 PROCEDURE — 76000 FLUOROSCOPY <1 HR PHYS/QHP: CPT | Performed by: ORTHOPAEDIC SURGERY

## 2020-12-29 PROCEDURE — 99233 SBSQ HOSP IP/OBS HIGH 50: CPT | Performed by: INTERNAL MEDICINE

## 2020-12-29 DEVICE — TOTAL HIP W/CER HEAD: Type: IMPLANTABLE DEVICE | Status: FUNCTIONAL

## 2020-12-29 DEVICE — FEMORAL HEAD Ø 32 SIZE M
Type: IMPLANTABLE DEVICE | Site: HIP | Status: FUNCTIONAL
Brand: MECTACER BIOLOX DELTA FEMORAL BALL HEAD

## 2020-12-29 DEVICE — CANCELLOUS BONE SCREW FLAT HEAD Ø 6,5 L30
Type: IMPLANTABLE DEVICE | Site: HIP | Status: FUNCTIONAL
Brand: MPACT ACETABULAR SYSTEM

## 2020-12-29 DEVICE — FLAT PE  HC LINER Ø 32 / E
Type: IMPLANTABLE DEVICE | Site: HIP | Status: FUNCTIONAL
Brand: MPACT ACETABULAR SYSTEM

## 2020-12-29 RX ORDER — HYDROMORPHONE HYDROCHLORIDE 1 MG/ML
0.6 INJECTION, SOLUTION INTRAMUSCULAR; INTRAVENOUS; SUBCUTANEOUS EVERY 5 MIN PRN
Status: DISCONTINUED | OUTPATIENT
Start: 2020-12-29 | End: 2020-12-29 | Stop reason: HOSPADM

## 2020-12-29 RX ORDER — ACETAMINOPHEN 325 MG/1
650 TABLET ORAL EVERY 6 HOURS PRN
Status: ACTIVE | OUTPATIENT
Start: 2020-12-29 | End: 2020-12-30

## 2020-12-29 RX ORDER — GABAPENTIN 600 MG/1
600 TABLET ORAL ONCE
Status: COMPLETED | OUTPATIENT
Start: 2020-12-29 | End: 2020-12-29

## 2020-12-29 RX ORDER — MORPHINE SULFATE 10 MG/ML
6 INJECTION, SOLUTION INTRAMUSCULAR; INTRAVENOUS EVERY 10 MIN PRN
Status: DISCONTINUED | OUTPATIENT
Start: 2020-12-29 | End: 2020-12-29 | Stop reason: HOSPADM

## 2020-12-29 RX ORDER — HALOPERIDOL 5 MG/ML
0.5 INJECTION INTRAMUSCULAR ONCE AS NEEDED
Status: ACTIVE | OUTPATIENT
Start: 2020-12-29 | End: 2020-12-29

## 2020-12-29 RX ORDER — DEXTROSE, SODIUM CHLORIDE, AND POTASSIUM CHLORIDE 5; .45; .15 G/100ML; G/100ML; G/100ML
INJECTION INTRAVENOUS CONTINUOUS
Status: DISCONTINUED | OUTPATIENT
Start: 2020-12-29 | End: 2020-12-31

## 2020-12-29 RX ORDER — HYDROMORPHONE HYDROCHLORIDE 1 MG/ML
0.4 INJECTION, SOLUTION INTRAMUSCULAR; INTRAVENOUS; SUBCUTANEOUS EVERY 5 MIN PRN
Status: DISCONTINUED | OUTPATIENT
Start: 2020-12-29 | End: 2020-12-29 | Stop reason: HOSPADM

## 2020-12-29 RX ORDER — DOCUSATE SODIUM 100 MG/1
100 CAPSULE, LIQUID FILLED ORAL 2 TIMES DAILY
Status: DISCONTINUED | OUTPATIENT
Start: 2020-12-29 | End: 2020-12-31

## 2020-12-29 RX ORDER — HYDROCODONE BITARTRATE AND ACETAMINOPHEN 7.5; 325 MG/1; MG/1
1 TABLET ORAL EVERY 6 HOURS PRN
Status: DISPENSED | OUTPATIENT
Start: 2020-12-29 | End: 2020-12-30

## 2020-12-29 RX ORDER — FAMOTIDINE 20 MG/1
20 TABLET ORAL ONCE
Status: COMPLETED | OUTPATIENT
Start: 2020-12-29 | End: 2020-12-29

## 2020-12-29 RX ORDER — MORPHINE SULFATE 4 MG/ML
2 INJECTION, SOLUTION INTRAMUSCULAR; INTRAVENOUS EVERY 10 MIN PRN
Status: DISCONTINUED | OUTPATIENT
Start: 2020-12-29 | End: 2020-12-29 | Stop reason: HOSPADM

## 2020-12-29 RX ORDER — DIPHENHYDRAMINE HCL 25 MG
25 CAPSULE ORAL EVERY 4 HOURS PRN
Status: DISCONTINUED | OUTPATIENT
Start: 2020-12-29 | End: 2020-12-31

## 2020-12-29 RX ORDER — DIPHENHYDRAMINE HCL 25 MG
25 CAPSULE ORAL EVERY 4 HOURS PRN
Status: ACTIVE | OUTPATIENT
Start: 2020-12-29 | End: 2020-12-30

## 2020-12-29 RX ORDER — NALOXONE HYDROCHLORIDE 0.4 MG/ML
0.08 INJECTION, SOLUTION INTRAMUSCULAR; INTRAVENOUS; SUBCUTANEOUS
Status: ACTIVE | OUTPATIENT
Start: 2020-12-29 | End: 2020-12-30

## 2020-12-29 RX ORDER — MELATONIN
325
Status: DISCONTINUED | OUTPATIENT
Start: 2020-12-30 | End: 2020-12-31

## 2020-12-29 RX ORDER — NALOXONE HYDROCHLORIDE 0.4 MG/ML
80 INJECTION, SOLUTION INTRAMUSCULAR; INTRAVENOUS; SUBCUTANEOUS AS NEEDED
Status: DISCONTINUED | OUTPATIENT
Start: 2020-12-29 | End: 2020-12-29 | Stop reason: HOSPADM

## 2020-12-29 RX ORDER — CEFAZOLIN SODIUM/WATER 2 G/20 ML
2 SYRINGE (ML) INTRAVENOUS ONCE
Status: COMPLETED | OUTPATIENT
Start: 2020-12-29 | End: 2020-12-29

## 2020-12-29 RX ORDER — ACETAMINOPHEN 500 MG
1000 TABLET ORAL ONCE
Status: DISCONTINUED | OUTPATIENT
Start: 2020-12-29 | End: 2020-12-29

## 2020-12-29 RX ORDER — PROCHLORPERAZINE EDISYLATE 5 MG/ML
5 INJECTION INTRAMUSCULAR; INTRAVENOUS ONCE AS NEEDED
Status: DISPENSED | OUTPATIENT
Start: 2020-12-29 | End: 2020-12-29

## 2020-12-29 RX ORDER — PROCHLORPERAZINE EDISYLATE 5 MG/ML
5 INJECTION INTRAMUSCULAR; INTRAVENOUS ONCE AS NEEDED
Status: DISCONTINUED | OUTPATIENT
Start: 2020-12-29 | End: 2020-12-29 | Stop reason: HOSPADM

## 2020-12-29 RX ORDER — ASPIRIN 325 MG
325 TABLET ORAL 2 TIMES DAILY
Status: DISCONTINUED | OUTPATIENT
Start: 2020-12-29 | End: 2020-12-31

## 2020-12-29 RX ORDER — HYDROCODONE BITARTRATE AND ACETAMINOPHEN 5; 325 MG/1; MG/1
1 TABLET ORAL EVERY 4 HOURS PRN
Status: DISCONTINUED | OUTPATIENT
Start: 2020-12-29 | End: 2020-12-31

## 2020-12-29 RX ORDER — ONDANSETRON 2 MG/ML
4 INJECTION INTRAMUSCULAR; INTRAVENOUS EVERY 4 HOURS PRN
Status: DISPENSED | OUTPATIENT
Start: 2020-12-29 | End: 2020-12-31

## 2020-12-29 RX ORDER — MORPHINE SULFATE 1 MG/ML
INJECTION, SOLUTION EPIDURAL; INTRATHECAL; INTRAVENOUS AS NEEDED
Status: DISCONTINUED | OUTPATIENT
Start: 2020-12-29 | End: 2020-12-29 | Stop reason: SURG

## 2020-12-29 RX ORDER — ONDANSETRON 2 MG/ML
4 INJECTION INTRAMUSCULAR; INTRAVENOUS ONCE AS NEEDED
Status: COMPLETED | OUTPATIENT
Start: 2020-12-29 | End: 2020-12-29

## 2020-12-29 RX ORDER — POLYETHYLENE GLYCOL 3350 17 G/17G
17 POWDER, FOR SOLUTION ORAL DAILY PRN
Status: DISCONTINUED | OUTPATIENT
Start: 2020-12-29 | End: 2020-12-31

## 2020-12-29 RX ORDER — PROCHLORPERAZINE EDISYLATE 5 MG/ML
10 INJECTION INTRAMUSCULAR; INTRAVENOUS EVERY 6 HOURS PRN
Status: ACTIVE | OUTPATIENT
Start: 2020-12-29 | End: 2020-12-31

## 2020-12-29 RX ORDER — DIPHENHYDRAMINE HYDROCHLORIDE 50 MG/ML
12.5 INJECTION INTRAMUSCULAR; INTRAVENOUS EVERY 4 HOURS PRN
Status: DISCONTINUED | OUTPATIENT
Start: 2020-12-29 | End: 2020-12-31

## 2020-12-29 RX ORDER — SODIUM CHLORIDE, SODIUM LACTATE, POTASSIUM CHLORIDE, CALCIUM CHLORIDE 600; 310; 30; 20 MG/100ML; MG/100ML; MG/100ML; MG/100ML
INJECTION, SOLUTION INTRAVENOUS CONTINUOUS
Status: DISCONTINUED | OUTPATIENT
Start: 2020-12-29 | End: 2020-12-29 | Stop reason: HOSPADM

## 2020-12-29 RX ORDER — CELECOXIB 200 MG/1
400 CAPSULE ORAL ONCE
Status: COMPLETED | OUTPATIENT
Start: 2020-12-29 | End: 2020-12-29

## 2020-12-29 RX ORDER — ONDANSETRON 2 MG/ML
4 INJECTION INTRAMUSCULAR; INTRAVENOUS ONCE AS NEEDED
Status: ACTIVE | OUTPATIENT
Start: 2020-12-29 | End: 2020-12-29

## 2020-12-29 RX ORDER — MORPHINE SULFATE 2 MG/ML
2 INJECTION, SOLUTION INTRAMUSCULAR; INTRAVENOUS EVERY 2 HOUR PRN
Status: DISCONTINUED | OUTPATIENT
Start: 2020-12-29 | End: 2020-12-31

## 2020-12-29 RX ORDER — DEXAMETHASONE SODIUM PHOSPHATE 4 MG/ML
VIAL (ML) INJECTION AS NEEDED
Status: DISCONTINUED | OUTPATIENT
Start: 2020-12-29 | End: 2020-12-29 | Stop reason: SURG

## 2020-12-29 RX ORDER — ACETAMINOPHEN 325 MG/1
650 TABLET ORAL EVERY 4 HOURS PRN
Status: DISCONTINUED | OUTPATIENT
Start: 2020-12-29 | End: 2020-12-31

## 2020-12-29 RX ORDER — SENNOSIDES 8.6 MG
17.2 TABLET ORAL NIGHTLY
Status: DISCONTINUED | OUTPATIENT
Start: 2020-12-29 | End: 2020-12-31

## 2020-12-29 RX ORDER — LIDOCAINE HYDROCHLORIDE 10 MG/ML
INJECTION, SOLUTION EPIDURAL; INFILTRATION; INTRACAUDAL; PERINEURAL AS NEEDED
Status: DISCONTINUED | OUTPATIENT
Start: 2020-12-29 | End: 2020-12-29 | Stop reason: SURG

## 2020-12-29 RX ORDER — DIPHENHYDRAMINE HYDROCHLORIDE 50 MG/ML
12.5 INJECTION INTRAMUSCULAR; INTRAVENOUS EVERY 4 HOURS PRN
Status: ACTIVE | OUTPATIENT
Start: 2020-12-29 | End: 2020-12-30

## 2020-12-29 RX ORDER — HYDROCODONE BITARTRATE AND ACETAMINOPHEN 5; 325 MG/1; MG/1
1 TABLET ORAL AS NEEDED
Status: DISCONTINUED | OUTPATIENT
Start: 2020-12-29 | End: 2020-12-29 | Stop reason: HOSPADM

## 2020-12-29 RX ORDER — FAMOTIDINE 10 MG/ML
20 INJECTION, SOLUTION INTRAVENOUS 2 TIMES DAILY
Status: DISCONTINUED | OUTPATIENT
Start: 2020-12-29 | End: 2020-12-31

## 2020-12-29 RX ORDER — BUPIVACAINE HYDROCHLORIDE 7.5 MG/ML
INJECTION, SOLUTION INTRASPINAL AS NEEDED
Status: DISCONTINUED | OUTPATIENT
Start: 2020-12-29 | End: 2020-12-29 | Stop reason: SURG

## 2020-12-29 RX ORDER — SODIUM CHLORIDE, SODIUM LACTATE, POTASSIUM CHLORIDE, CALCIUM CHLORIDE 600; 310; 30; 20 MG/100ML; MG/100ML; MG/100ML; MG/100ML
INJECTION, SOLUTION INTRAVENOUS CONTINUOUS
Status: DISCONTINUED | OUTPATIENT
Start: 2020-12-29 | End: 2020-12-31

## 2020-12-29 RX ORDER — MORPHINE SULFATE 4 MG/ML
4 INJECTION, SOLUTION INTRAMUSCULAR; INTRAVENOUS EVERY 2 HOUR PRN
Status: DISCONTINUED | OUTPATIENT
Start: 2020-12-29 | End: 2020-12-31

## 2020-12-29 RX ORDER — HYDROMORPHONE HYDROCHLORIDE 1 MG/ML
0.4 INJECTION, SOLUTION INTRAMUSCULAR; INTRAVENOUS; SUBCUTANEOUS
Status: ACTIVE | OUTPATIENT
Start: 2020-12-29 | End: 2020-12-30

## 2020-12-29 RX ORDER — HYDROCODONE BITARTRATE AND ACETAMINOPHEN 5; 325 MG/1; MG/1
2 TABLET ORAL AS NEEDED
Status: DISCONTINUED | OUTPATIENT
Start: 2020-12-29 | End: 2020-12-29 | Stop reason: HOSPADM

## 2020-12-29 RX ORDER — ONDANSETRON 2 MG/ML
INJECTION INTRAMUSCULAR; INTRAVENOUS AS NEEDED
Status: DISCONTINUED | OUTPATIENT
Start: 2020-12-29 | End: 2020-12-29 | Stop reason: SURG

## 2020-12-29 RX ORDER — ACETAMINOPHEN 500 MG
1000 TABLET ORAL ONCE
Status: COMPLETED | OUTPATIENT
Start: 2020-12-29 | End: 2020-12-29

## 2020-12-29 RX ORDER — HYDROMORPHONE HYDROCHLORIDE 1 MG/ML
0.6 INJECTION, SOLUTION INTRAMUSCULAR; INTRAVENOUS; SUBCUTANEOUS
Status: ACTIVE | OUTPATIENT
Start: 2020-12-29 | End: 2020-12-30

## 2020-12-29 RX ORDER — HYDROMORPHONE HYDROCHLORIDE 1 MG/ML
0.2 INJECTION, SOLUTION INTRAMUSCULAR; INTRAVENOUS; SUBCUTANEOUS EVERY 5 MIN PRN
Status: DISCONTINUED | OUTPATIENT
Start: 2020-12-29 | End: 2020-12-29 | Stop reason: HOSPADM

## 2020-12-29 RX ORDER — CELECOXIB 200 MG/1
200 CAPSULE ORAL EVERY 12 HOURS SCHEDULED
Status: DISCONTINUED | OUTPATIENT
Start: 2020-12-29 | End: 2020-12-31

## 2020-12-29 RX ORDER — HALOPERIDOL 5 MG/ML
0.25 INJECTION INTRAMUSCULAR ONCE AS NEEDED
Status: DISCONTINUED | OUTPATIENT
Start: 2020-12-29 | End: 2020-12-29 | Stop reason: HOSPADM

## 2020-12-29 RX ORDER — METOCLOPRAMIDE 10 MG/1
10 TABLET ORAL ONCE
Status: COMPLETED | OUTPATIENT
Start: 2020-12-29 | End: 2020-12-29

## 2020-12-29 RX ORDER — MORPHINE SULFATE 2 MG/ML
1 INJECTION, SOLUTION INTRAMUSCULAR; INTRAVENOUS EVERY 2 HOUR PRN
Status: DISCONTINUED | OUTPATIENT
Start: 2020-12-29 | End: 2020-12-31

## 2020-12-29 RX ORDER — FAMOTIDINE 20 MG/1
20 TABLET ORAL 2 TIMES DAILY
Status: DISCONTINUED | OUTPATIENT
Start: 2020-12-29 | End: 2020-12-31

## 2020-12-29 RX ORDER — TRANEXAMIC ACID 10 MG/ML
INJECTION, SOLUTION INTRAVENOUS AS NEEDED
Status: DISCONTINUED | OUTPATIENT
Start: 2020-12-29 | End: 2020-12-29 | Stop reason: SURG

## 2020-12-29 RX ORDER — HYDROCODONE BITARTRATE AND ACETAMINOPHEN 7.5; 325 MG/1; MG/1
2 TABLET ORAL EVERY 6 HOURS PRN
Status: DISPENSED | OUTPATIENT
Start: 2020-12-29 | End: 2020-12-30

## 2020-12-29 RX ORDER — MORPHINE SULFATE 4 MG/ML
4 INJECTION, SOLUTION INTRAMUSCULAR; INTRAVENOUS EVERY 10 MIN PRN
Status: DISCONTINUED | OUTPATIENT
Start: 2020-12-29 | End: 2020-12-29 | Stop reason: HOSPADM

## 2020-12-29 RX ORDER — SODIUM PHOSPHATE, DIBASIC AND SODIUM PHOSPHATE, MONOBASIC 7; 19 G/133ML; G/133ML
1 ENEMA RECTAL ONCE AS NEEDED
Status: DISCONTINUED | OUTPATIENT
Start: 2020-12-29 | End: 2020-12-31

## 2020-12-29 RX ORDER — BISACODYL 10 MG
10 SUPPOSITORY, RECTAL RECTAL
Status: DISCONTINUED | OUTPATIENT
Start: 2020-12-29 | End: 2020-12-31

## 2020-12-29 RX ORDER — HYDROCODONE BITARTRATE AND ACETAMINOPHEN 5; 325 MG/1; MG/1
2 TABLET ORAL EVERY 4 HOURS PRN
Status: DISCONTINUED | OUTPATIENT
Start: 2020-12-29 | End: 2020-12-31

## 2020-12-29 RX ORDER — DIPHENHYDRAMINE HYDROCHLORIDE 50 MG/ML
25 INJECTION INTRAMUSCULAR; INTRAVENOUS ONCE AS NEEDED
Status: ACTIVE | OUTPATIENT
Start: 2020-12-29 | End: 2020-12-29

## 2020-12-29 RX ADMIN — ONDANSETRON 4 MG: 2 INJECTION INTRAMUSCULAR; INTRAVENOUS at 10:51:00

## 2020-12-29 RX ADMIN — MORPHINE SULFATE 0.2 MG: 1 INJECTION, SOLUTION EPIDURAL; INTRATHECAL; INTRAVENOUS at 10:49:00

## 2020-12-29 RX ADMIN — LIDOCAINE HYDROCHLORIDE 25 MG: 10 INJECTION, SOLUTION EPIDURAL; INFILTRATION; INTRACAUDAL; PERINEURAL at 10:46:00

## 2020-12-29 RX ADMIN — TRANEXAMIC ACID 1000 MG: 10 INJECTION, SOLUTION INTRAVENOUS at 11:15:00

## 2020-12-29 RX ADMIN — SODIUM CHLORIDE, SODIUM LACTATE, POTASSIUM CHLORIDE, CALCIUM CHLORIDE: 600; 310; 30; 20 INJECTION, SOLUTION INTRAVENOUS at 12:45:00

## 2020-12-29 RX ADMIN — DEXAMETHASONE SODIUM PHOSPHATE 8 MG: 4 MG/ML VIAL (ML) INJECTION at 10:51:00

## 2020-12-29 RX ADMIN — BUPIVACAINE HYDROCHLORIDE 1.6 ML: 7.5 INJECTION, SOLUTION INTRASPINAL at 10:49:00

## 2020-12-29 RX ADMIN — CEFAZOLIN SODIUM/WATER 2 G: 2 G/20 ML SYRINGE (ML) INTRAVENOUS at 10:51:00

## 2020-12-29 NOTE — PHYSICAL THERAPY NOTE
PT orders for evaluate and treat received. Spoke with pt who reports she is extremely nauseous and does not feel up to moving at all right now. Gave pt crackers-she received IV nausea meds a little bit earlier. Therapy eval deferred at this time.  Will re-a

## 2020-12-29 NOTE — BRIEF OP NOTE
Pre-Operative Diagnosis: osteoarthritis of right hip     Post-Operative Diagnosis: osteoarthritis of right hip      Procedure Performed:   Procedure(s):  right total hip replacement, anterior approach    Surgeon(s) and Role:     * Ephraim Gallagher M

## 2020-12-29 NOTE — OPERATIVE REPORT
OPERATIVE REPORT     PREOPERATIVE DIAGNOSIS:  Osteoarthritis, right hip. POSTOPERATIVE DIAGNOSIS:  Osteoarthritis, right hip. PROCEDURE:  right total hip arthroplasty via anterior approach with C-arm control.      ASSISTANT:  Maite Spears PA-C. were verified with the C-arm. A [employer] 2-hole acetabulum was inserted in the appropriate version and inclination and obtained a good press-fit. A screw was placed in the ilium for supplementary fixation and obtained excellent purchase.   A highly c

## 2020-12-29 NOTE — ANESTHESIA POSTPROCEDURE EVALUATION
Patient: Norma Torres    Procedure Summary     Date: 12/29/20 Room / Location: [address] [place] [address] MAIN OR 11 / [address] [place] [address] MAIN OR    Anesthesia Start: 1760 Anesthesia Stop: 0875    Procedure: HIP TOTAL ANTERIOR APPROACH (Right Hip) Diagnosis: (osteoarthritis of right hip)

## 2020-12-29 NOTE — ANESTHESIA PREPROCEDURE EVALUATION
Anesthesia PreOp Note    HPI:     Josue Torres is a 63year old female who presents for preoperative consultation requested by: Elinor Robledo MD    Date of Surgery: 12/29/2020    Procedure(s):  HIP TOTAL ANTERIOR APPROACH  Indication: osteoar Week at Unknown time    •  Cholecalciferol (VITAMIN D-3 OR), Take by mouth., Disp: , Rfl: , 12/22/2020 at Unknown time    •  Glucosamine-Chondroitin (GLUCOSAMINE CHONDR COMPLEX OR), Take by mouth., Disp: , Rfl: , 12/22/2020 at Unknown time    •  acetaminop Physical activity        Days per week: Not on file        Minutes per session: Not on file      Stress: Not on file    Relationships      Social connections        Talks on phone: Not on file        Gets together: Not on file        Attends [employer] se anesthetic complications (ponv)   Airway   Mallampati: II  TM distance: >3 FB  Neck ROM: full  Dental      Comment: Patient denies any additional loose, missing, and chipped teeth.     Pulmonary - negative ROS and normal exam    breath sounds clear to auscu

## 2020-12-29 NOTE — PLAN OF CARE
Problem: Patient Centered Care  Goal: Patient preferences are identified and integrated in the patient's plan of care  Description: Interventions:  - What would you like us to know as we care for you? Pt has sister here for support.    - Provide timely, c integrity  - Assess and document dressing/incision, wound bed, drain sites and surrounding tissue  - Implement wound care per orders  - Initiate isolation precautions as appropriate  - Initiate Pressure Ulcer prevention bundle as indicated  Outcome: Brigido that affect risk of falls.   - Amawalk fall precautions as indicated by assessment.  - Educate pt/family on patient safety including physical limitations  - Instruct pt to call for assistance with activity based on assessment  - Modify environment to redu

## 2020-12-29 NOTE — PROGRESS NOTES
[facility]    Progress Note    Ma Carrel Chism Patient Status:  Inpatient    1960 MRN [id number]   Location [facility] 4W/SW/SE Attending Meg Nickerson, *   Hosp Day # 0 PCP Mana Gunn DO       Subjective:   Bonita breakfast   • vancomycin  15 mg/kg (Adjusted) Intravenous Once       Current PRN Inpatient Meds:      Naloxone HCl, acetaminophen, HYDROcodone-acetaminophen, HYDROcodone-acetaminophen, HYDROmorphone HCl, HYDROmorphone HCl, ondansetron HCl, Prochlorperazine CRP, PCT    Assessment and Plan:     Primary osteoarthritis of right hip  - s/p R hip arthroplasty  - cont pain and symptom control  -Courage incentive spirometry  - PT/OT per Ortho  - F/u Ortho recs            Quality:  · PT/OT:  Per surgery  · DVT Prophy

## 2020-12-29 NOTE — ANESTHESIA PROCEDURE NOTES
Spinal Block  Performed by: Otto Napier MD  Authorized by: Otto Napier MD       General Information and Staff    Start Time:  12/29/2020 10:45 AM  End Time:  12/29/2020 10:50 AM  Anesthesiologist:  Otto Napier MD  Performed by:   Anesthesiologis

## 2020-12-30 LAB
DEPRECATED RDW RBC AUTO: 42.9 FL (ref 35.1–46.3)
ERYTHROCYTE [DISTWIDTH] IN BLOOD BY AUTOMATED COUNT: 12.8 % (ref 11–15)
HCT VFR BLD AUTO: 31.3 %
HGB BLD-MCNC: 10 G/DL
MCH RBC QN AUTO: 29.2 PG (ref 26–34)
MCHC RBC AUTO-ENTMCNC: 31.9 G/DL (ref 31–37)
MCV RBC AUTO: 91.3 FL
PLATELET # BLD AUTO: 225 10(3)UL (ref 150–450)
RBC # BLD AUTO: 3.43 X10(6)UL
WBC # BLD AUTO: 12.4 X10(3) UL (ref 4–11)

## 2020-12-30 PROCEDURE — 99232 SBSQ HOSP IP/OBS MODERATE 35: CPT | Performed by: HOSPITALIST

## 2020-12-30 RX ORDER — ASPIRIN 325 MG
325 TABLET ORAL 2 TIMES DAILY
Qty: 60 TABLET | Refills: 0 | Status: SHIPPED | OUTPATIENT
Start: 2020-12-30 | End: 2021-02-22 | Stop reason: ALTCHOICE

## 2020-12-30 RX ORDER — HYDROCODONE BITARTRATE AND ACETAMINOPHEN 5; 325 MG/1; MG/1
1 TABLET ORAL EVERY 4 HOURS PRN
Qty: 50 TABLET | Refills: 0 | Status: SHIPPED | OUTPATIENT
Start: 2020-12-30 | End: 2021-01-20 | Stop reason: ALTCHOICE

## 2020-12-30 RX ORDER — MELATONIN
325
Qty: 20 TABLET | Refills: 0 | Status: SHIPPED | OUTPATIENT
Start: 2020-12-31 | End: 2021-02-22 | Stop reason: ALTCHOICE

## 2020-12-30 RX ORDER — CELECOXIB 200 MG/1
200 CAPSULE ORAL EVERY 12 HOURS SCHEDULED
Qty: 60 CAPSULE | Refills: 0 | Status: SHIPPED | OUTPATIENT
Start: 2020-12-30 | End: 2021-02-22 | Stop reason: ALTCHOICE

## 2020-12-30 NOTE — PHYSICAL THERAPY NOTE
PHYSICAL THERAPY TREATMENT NOTE - INPATIENT     Room Number: 982/900-B       Presenting Problem: primary OA of right hip s/p right total hip arthroplasty    Problem List  Active Problems:    Preop testing    Primary osteoarthritis of right hip      PHYSICA Static Sitting: Good  Dynamic Sitting: Good           Static Standing: Fair +  Dynamic Standing: Fair    ACTIVITY TOLERANCE           BP: 115/83  BP Location: Right arm  BP Method: Automatic  Patient Position: Sitting    O2 WALK Current Status SBA 60 feet with RW   Goal #4 Patient will negotiate 12 stairs/one curb w/ assistive device and supervision   Goal #4   Current Status NT   Goal #5 Patient verbalizes and/or demonstrates all precautions and safety concerns independently

## 2020-12-30 NOTE — OCCUPATIONAL THERAPY NOTE
OCCUPATIONAL THERAPY EVALUATION - INPATIENT      Room Number: 181/731-L  Evaluation Date: 12/30/2020  Type of Evaluation: Initial  Presenting Problem: (s/p R LISA)    Physician Order: IP Consult to Occupational Therapy  Reason for Therapy: ADL/IADL Dysfunct eval/education; Compensatory technique education       OCCUPATIONAL THERAPY MEDICAL/SOCIAL HISTORY     Problem List   Active Problems:    Preop testing    Primary osteoarthritis of right hip      Past Medical History  Past Medical History:   Diagnosis Date O2 SATURATIONS   100%             COGNITION  Overall Cognitive Status:  WFL - within functional limits    RANGE OF MOTION   Upper extremity ROM is within functional limits     STRENGTH ASSESSMENT  Upper extremity strength is within functional limits more sessions    9 [facility] MOT/R  5 [facility]  #[id number]

## 2020-12-30 NOTE — CM/SW NOTE
SW received MDO for Surgery. Per chart review pt is requesting [facility]. SW sent referrals in Aidin for accepting [facility] agencies.      [facility] accepted pt but needs (Progress note from MD, cosigned [facility] orders and Face to Face) all entered/ signed on the s

## 2020-12-30 NOTE — ANESTHESIA POST-OP FOLLOW-UP NOTE
Post Spinal Duramorph for  Total Joint. Pt. Seen and examined. Pain as charted. No residual numbness.   Site ok   Side effects: nausea and itching resolved   Temp:  [97.1 °F (36.2 °C)-98.2 °F (36.8 °C)] 98 °F (36.7 °C)  Pulse:  [] 86

## 2020-12-30 NOTE — PROGRESS NOTES
[facility]    Progress Note    Len Torres Patient Status:  Inpatient    1960 MRN [id number]   Location [facility] 4W// Attending Meth[place] Morning, *   Hosp Day # 1 PCP David Rivera DO       Subjective:     Gertrude Nunez Naloxone HCl, acetaminophen, HYDROcodone-acetaminophen, HYDROcodone-acetaminophen, HYDROmorphone HCl, HYDROmorphone HCl, diphenhydrAMINE HCl **OR** diphenhydrAMINE, sodium chloride 0.9%, PEG 3350, magnesium hydroxide, bisacodyl, Fleet Enema, ondansetron control  - encourage incentive spirometry  - PT/OT per Ortho  - F/u Ortho recs            Quality:  · PT/OT:  Per surgery  · DVT Prophylaxis:  Aspirin 325  · CODE status: Full   · Dispo: per clinical course      Greater than 35 minutes spent, >50% spent co

## 2020-12-30 NOTE — PHYSICAL THERAPY NOTE
PHYSICAL THERAPY HIP EVALUATION - INPATIENT     Room Number: 333/348-G  Evaluation Date: 12/30/2020  Type of Evaluation: Initial  Physician Order: PT Eval and Treat    Presenting Problem: primary OA of right hip s/p right total hip arthroplasty  Reason for motion;Strengthening;Stair training;Transfer training;Balance training  Rehab Potential : Good  Frequency (Obs): Daily       PHYSICAL THERAPY MEDICAL/SOCIAL HISTORY     History related to current admission: patient with hx of vision impairment, OA, and thy Activity promotion; Body mechanics;Repositioning    COGNITION  · Overall Cognitive Status:  WFL - within functional limits    RANGE OF MOTION AND STRENGTH ASSESSMENT  Upper extremity ROM and strength are within functional limits     Lower extremity ROM is w chair;Call light within reach; Needs met;RN aware of session/findings; All patient questions and concerns addressed    CURRENT GOALS    Goals to be met by: 1/1/21  Patient Goal Patient's self-stated goal is: to go home   Goal #1 Patient is able to demonstrat

## 2020-12-30 NOTE — PROGRESS NOTES
[facility] [facility]    Progress Note    Ma Carrel Chism Patient Status:  Inpatient    1960 MRN [id number]   Location [facility] 4W/SW/SE Attending Andrey Fountain MD   [address] Day # 1 PCP Mana Gunn DO        Subjective:   Ma Carrel Robert Quan MD on 12/29/2020 at 1:12 PM     Finalized by (CST): Robert Quan MD on 12/29/2020 at 1:12 PM          Xr Hip W Or Wo Pelvis 2 Or 3 Views, Right (cpt=73502)    Result Date: 12/29/2020  CONCLUSION:  1.  Status post right hip

## 2020-12-30 NOTE — PLAN OF CARE
Problem: Patient Centered Care  Goal: Patient preferences are identified and integrated in the patient's plan of care  Description: Interventions:  - What would you like us to know as we care for you?   - Provide timely, complete, and accurate informatio oral hygiene regimen  - Implement oral medicated treatments as ordered  Outcome: Progressing     Problem: PAIN - ADULT  Goal: Verbalizes/displays adequate comfort level or patient's stated pain goal  Description: INTERVENTIONS:  - Encourage pt to monitor p partner in discharge planning  - Arrange for needed discharge resources and transportation as appropriate  - Identify discharge learning needs (meds, wound care, etc)  - Arrange for interpreters to assist at discharge as needed  - Consider post-discharge p

## 2020-12-31 VITALS
TEMPERATURE: 99 F | DIASTOLIC BLOOD PRESSURE: 59 MMHG | BODY MASS INDEX: 29.38 KG/M2 | SYSTOLIC BLOOD PRESSURE: 93 MMHG | OXYGEN SATURATION: 100 % | WEIGHT: 185 LBS | HEART RATE: 87 BPM | RESPIRATION RATE: 16 BRPM | HEIGHT: 66.5 IN

## 2020-12-31 LAB — HGB BLD-MCNC: 9 G/DL

## 2020-12-31 PROCEDURE — 99239 HOSP IP/OBS DSCHRG MGMT >30: CPT | Performed by: HOSPITALIST

## 2020-12-31 RX ORDER — ONDANSETRON 4 MG/1
4 TABLET, FILM COATED ORAL ONCE
Status: DISCONTINUED | OUTPATIENT
Start: 2020-12-31 | End: 2020-12-31

## 2020-12-31 RX ORDER — ONDANSETRON 4 MG/1
4 TABLET, FILM COATED ORAL EVERY 8 HOURS PRN
Qty: 14 TABLET | Refills: 0 | Status: SHIPPED | OUTPATIENT
Start: 2020-12-31 | End: 2021-01-20 | Stop reason: ALTCHOICE

## 2020-12-31 NOTE — OCCUPATIONAL THERAPY NOTE
OCCUPATIONAL THERAPY TREATMENT NOTE - INPATIENT    Room Number: 586/005-F         Presenting Problem: (s/p R LISA)     Problem List  Active Problems:    Preop testing    Primary osteoarthritis of right hip      OCCUPATIONAL THERAPY ASSESSMENT     Pt seen up Automatic  Patient Position: Sitting    O2 SATURATIONS     SPO2 Ambulation on Room Air: 96          ACTIVITIES OF DAILY LIVING ASSESSMENT  AM-PAC ‘6-Clicks’ Inpatient Daily Activity Short Form  How much help from another person does the patient currently n self care task at sink level with SBA   Comment: goal met                  Goals  on: 21  Frequency: 1-2 more sessions

## 2020-12-31 NOTE — PHYSICAL THERAPY NOTE
PHYSICAL THERAPY TREATMENT NOTE - INPATIENT     Room Number: 945/442-F       Presenting Problem: OA of right hip, R LISA    Problem List  Active Problems:    Preop testing    Primary osteoarthritis of right hip      PHYSICAL THERAPY ASSESSMENT   C0-treat wi Static Sitting: Good  Dynamic Sitting: Good           Static Standing: Fair +  Dynamic Standing: Fair    ACTIVITY TOLERANCE                         O feet with RW   Goal #4 Patient will negotiate 12 stairs/one curb w/ assistive device and supervision   Goal #4   Current Status 6 step with one rail with CGA   Goal #5 Patient verbalizes and/or demonstrates all precautions and safety concerns independently

## 2020-12-31 NOTE — PLAN OF CARE
Robles Kowalski is up with standby assist and the walker this evening, tolerating activity well. Taking norco prn for pain, sparingly. Zofran given prn for nausea, no emesis. Tolerating room air. Aspirin and scd's for dvt prophylaxis. Voiding freely postop.  LBM 12/ request assistance  - Assess pain using appropriate pain scale  - Administer analgesics based on type and severity of pain and evaluate response  - Implement non-pharmacological measures as appropriate and evaluate response  - Consider cultural and social of patient/family/discharge partner  - Complete POLST form as appropriate  - Assess patient's ability to be responsible for managing their own health  - Refer to Case Management Department for coordinating discharge planning if the patient needs post-hospi

## 2020-12-31 NOTE — DISCHARGE SUMMARY
[facility] [facility]    Discharge Summary    John Torres Patient Status:  Inpatient    1960 MRN [id number]   Location [facility] 4W/SW/SE Attending Jaida Anthony MD   Hosp Day # 2 [facility],      Date of Admission:  transfusion.       Hospital Course:   Primary osteoarthritis of right hip  - s/p R hip arthroplasty  - cont pain and symptom control  - encourage incentive spirometry  - PT/OT per Ortho--> home pt/ot  - asa for dvt prophylaxis       Consultations:   Ortho [facility] [id number]    Phone: [phone]   · aspirin 325 MG Tabs  · celecoxib 200 MG Caps  · ferrous sulfate 325 (65 FE) MG Tbec  · HYDROcodone-acetaminophen 5-325 MG Tabs  · Ondansetron HCl 4 mg tablet         Follow up Visits:  Follow-up with pcp

## 2020-12-31 NOTE — PROGRESS NOTES
[facility]    Progress Note    Norma Torres Patient Status:  Inpatient    1960 MRN [id number]   Location [facility] 4W/SW/SE Attending Samm Martin MD   [facility] Day # 2 PCP Nicol Harley DO       Subjective:   Miguel Angel Wilkins 12/02/2020    PTT 30.8 12/02/2020    INR 1.03 12/02/2020       Xr Fluoroscopy C-arm Time <1 Hour  (cpt=76000)    Result Date: 12/29/2020  CONCLUSION:  1. Intraoperative fluoroscopy was utilized.     Dictated by (CST): Jorge Luis Stapleton MD on 12/29/202

## 2020-12-31 NOTE — PLAN OF CARE
Problem: Patient Centered Care  Goal: Patient preferences are identified and integrated in the patient's plan of care  Description: Interventions:  - What would you like us to know as we care for you?  To keep me and my family updated   - Provide timely, surrounding tissue  - Implement wound care per orders  - Initiate isolation precautions as appropriate  - Initiate Pressure Ulcer prevention bundle as indicated  Outcome: Progressing  See above   Goal: Oral mucous membranes remain intact  Description: INTE assessment  - Modify environment to reduce risk of injury  - Provide assistive devices as appropriate  - Consider OT/PT consult to assist with strengthening/mobility  - Encourage toileting schedule  Outcome: Progressing  Patient has remained fall free duri answered. Patient will be accompanied home with her daughter, and although patient lives alone, daughters will be able to stay with the patient and help out. Patient is otherwise ready for d/c. All questions answered. Ready for d/c.       Problem: Meghan Thomas

## 2021-01-06 NOTE — INTERVAL H&P NOTE
Pre-op Diagnosis: osteoarthritis of right hip    The above referenced H&P was reviewed by Itzel Dickinson PA-C on 1/6/2021, the patient was examined and no significant changes have occurred in the patient's condition since the H&P was performed

## 2021-01-07 ENCOUNTER — TELEPHONE (OUTPATIENT)
Dept: ORTHOPEDICS CLINIC | Facility: CLINIC | Age: 61
End: 2021-01-07

## 2021-01-07 ENCOUNTER — TELEMEDICINE (OUTPATIENT)
Dept: FAMILY MEDICINE CLINIC | Facility: CLINIC | Age: 61
End: 2021-01-07

## 2021-01-07 VITALS — SYSTOLIC BLOOD PRESSURE: 125 MMHG | DIASTOLIC BLOOD PRESSURE: 80 MMHG | HEART RATE: 67 BPM

## 2021-01-07 DIAGNOSIS — Z96.641 STATUS POST TOTAL HIP REPLACEMENT, RIGHT: Primary | ICD-10-CM

## 2021-01-07 DIAGNOSIS — D64.9 ANEMIA, UNSPECIFIED TYPE: ICD-10-CM

## 2021-01-07 DIAGNOSIS — I95.9 HYPOTENSION, UNSPECIFIED HYPOTENSION TYPE: ICD-10-CM

## 2021-01-07 PROCEDURE — 99214 OFFICE O/P EST MOD 30 MIN: CPT | Performed by: FAMILY MEDICINE

## 2021-01-07 PROCEDURE — 3079F DIAST BP 80-89 MM HG: CPT | Performed by: FAMILY MEDICINE

## 2021-01-07 PROCEDURE — 3074F SYST BP LT 130 MM HG: CPT | Performed by: FAMILY MEDICINE

## 2021-01-07 RX ORDER — DOCUSATE SODIUM 100 MG/1
100 CAPSULE, LIQUID FILLED ORAL 2 TIMES DAILY
COMMUNITY
End: 2021-02-22 | Stop reason: ALTCHOICE

## 2021-01-07 NOTE — PROGRESS NOTES
CC:  Patient presents with: Follow - Up: right hip surgery follow up. Pt feels good      HPI: 63year old female presenting for video visit follow-up after right TKA 12/29.    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 • Ondansetron HCl (ZOFRAN) 4 mg tablet Take 1 tablet (4 mg total) by mouth every 8 (eight) hours as needed for Nausea.  14 tablet 0   • HYDROcodone-acetaminoph Possibly due to postoperative anemia and she will continue daily ferrous sulfate and repeat CBC in a few weeks  - Importance of fluid hydration reviewed and taking her time when going from sitting to standing   - Call back if not improving or worsening

## 2021-01-07 NOTE — TELEPHONE ENCOUNTER
Returned pt's pc and gave her instructions how to send forms to dept. Pt will email disab forms. Start date: 12/29/20 (surgery date).

## 2021-01-08 NOTE — TELEPHONE ENCOUNTER
disab received in forms dept. Logged for processing. Sent sougout message for missing hipaa for disab paperwork.

## 2021-01-20 ENCOUNTER — OFFICE VISIT (OUTPATIENT)
Dept: ORTHOPEDICS CLINIC | Facility: CLINIC | Age: 61
End: 2021-01-20

## 2021-01-20 ENCOUNTER — HOSPITAL ENCOUNTER (OUTPATIENT)
Dept: GENERAL RADIOLOGY | Facility: HOSPITAL | Age: 61
Discharge: HOME | End: 2021-01-20
Attending: ORTHOPAEDIC SURGERY
Payer: COMMERCIAL

## 2021-01-20 DIAGNOSIS — Z47.89 ORTHOPEDIC AFTERCARE: ICD-10-CM

## 2021-01-20 DIAGNOSIS — Z47.89 ORTHOPEDIC AFTERCARE: Primary | ICD-10-CM

## 2021-01-20 PROCEDURE — 99024 POSTOP FOLLOW-UP VISIT: CPT | Performed by: ORTHOPAEDIC SURGERY

## 2021-01-20 PROCEDURE — 73502 X-RAY EXAM HIP UNI 2-3 VIEWS: CPT | Performed by: ORTHOPAEDIC SURGERY

## 2021-01-20 RX ORDER — ACETAMINOPHEN 500 MG
500 TABLET ORAL EVERY 6 HOURS PRN
COMMUNITY

## 2021-01-20 NOTE — PROGRESS NOTES
NURSING INTAKE COMMENTS: Patient presents with:  Post-Op: s/p right LISA on 12/29-  pt rates her pain 1/10. HPI: This 63year old female presents today for follow-up after right total hip arthroplasty through an anterior approach.   Her surgery was 3 we Cholecalciferol (VITAMIN D-3 OR) Take by mouth. • Glucosamine-Chondroitin (GLUCOSAMINE CHONDR COMPLEX OR) Take by mouth.        No Known Allergies  Family History   Problem Relation Age of Onset   • Hypertension Mother    • Heart Attack Mother    • Jocelyne Nathan Value Date    WBC 12.4 (H) 12/30/2020    HGB 9.0 (L) 12/31/2020    .0 12/30/2020      Lab Results   Component Value Date    GLU 83 12/02/2020    BUN 13 12/02/2020    CREATSERUM 0.88 12/02/2020    GFRNAA 71 12/02/2020    GFRAA 82 12/02/2020        As

## 2021-01-27 ENCOUNTER — PATIENT OUTREACH (OUTPATIENT)
Dept: CASE MANAGEMENT | Age: 61
End: 2021-01-27

## 2021-01-27 DIAGNOSIS — M16.11 PRIMARY OSTEOARTHRITIS OF RIGHT HIP: ICD-10-CM

## 2021-01-27 NOTE — PROGRESS NOTES
Initial Post Discharge Follow Up   Discharge Date: 12/31/20  Contact Date: 1/27/2021    Consent Verification:  Assessment Completed With: Patient  HIPAA Verified?   Yes    Discharge Dx:   S/P right total hip replacement, anterior approach by Dr. Dione Quiñonez video   Discharge class in person no   Discharge video  no     Needs post D/C:   Now that you are home, are there any needs or concerns you need addressed before your next visit with your PCP?  (DME, meds, disease concerns, Etc): No     Follow up appointme with Ruperto Santos, RE[facility] ([address] [place] [address])        Mar 09, 2021  2:00 PM CST Physical Therapy Ortho Treatment with Alirio Sampson, [address] [place] [address]

## 2021-01-27 NOTE — TELEPHONE ENCOUNTER
Ada received in forms dept logged for processing. Written consent for hipaa received through [employer].

## 2021-02-02 ENCOUNTER — OFFICE VISIT (OUTPATIENT)
Dept: PHYSICAL THERAPY | Facility: HOSPITAL | Age: 61
End: 2021-02-02
Attending: PHYSICIAN ASSISTANT
Payer: COMMERCIAL

## 2021-02-02 DIAGNOSIS — Z47.89 ORTHOPEDIC AFTERCARE: ICD-10-CM

## 2021-02-02 PROCEDURE — 97116 GAIT TRAINING THERAPY: CPT

## 2021-02-02 PROCEDURE — 97161 PT EVAL LOW COMPLEX 20 MIN: CPT

## 2021-02-02 PROCEDURE — 97110 THERAPEUTIC EXERCISES: CPT

## 2021-02-02 NOTE — PROGRESS NOTES
POST-OP HIP EVALUATION:   Referring Physician: Dr. Luis Damon  Diagnosis: Orthopedic aftercare (Z47.89)     Date of Service: 2/2/2021     PATIENT SUMMARY   Luz Torres is a 63year old female who presents to therapy today s/p R LISA with anterior approach still. It helps to have pillow between knees. She also thinks it's because it's now her own bed. Only taking tylenol every ~6 hrs now. Hip/WB Precautions: don't cross legs, don't extend leg out behind her, sleep with pillow between knees.  MD told pt th work tasks. Pt and PT discussed evaluation findings, pathology, POC and HEP. Pt voiced understanding and performs HEP correctly without reported pain.  Skilled Physical Therapy is medically necessary to address the above impairments and reach functional go be increasing hip pain. Pt also instructed to continue with LAQ's and heel/toe raises as well as any other home exercises that [facility] PT gave her as long as it does not increase her pain. Reviewed post-op precautions with pt as well.  Also discussed pt lying chen for this course of care. Thank you for your referral. Please co-sign or sign and return this letter via fax as soon as possible to [phone].  If you have any questions, please contact me at Dept: [phone]    Sincerely,  Electronically signed by jaiden

## 2021-02-04 ENCOUNTER — TELEPHONE (OUTPATIENT)
Dept: FAMILY MEDICINE CLINIC | Facility: CLINIC | Age: 61
End: 2021-02-04

## 2021-02-04 ENCOUNTER — TELEPHONE (OUTPATIENT)
Dept: PHYSICAL THERAPY | Facility: HOSPITAL | Age: 61
End: 2021-02-04

## 2021-02-04 ENCOUNTER — APPOINTMENT (OUTPATIENT)
Dept: PHYSICAL THERAPY | Facility: HOSPITAL | Age: 61
End: 2021-02-04
Attending: PHYSICIAN ASSISTANT
Payer: COMMERCIAL

## 2021-02-04 NOTE — TELEPHONE ENCOUNTER
Angie calderón [facility] called asking is Dr Brock Saunders is willing to sign off on patient's home health orders

## 2021-02-04 NOTE — TELEPHONE ENCOUNTER
Yes, she is my patient and okay signing off on orders as long as it is not meant for her orthopedic specialist.

## 2021-02-04 NOTE — TELEPHONE ENCOUNTER
Piter Domínguez at [facility], informed of Dr. Bonita Romo message, provided fax number and will fax order medication orders.

## 2021-02-04 NOTE — TELEPHONE ENCOUNTER
Cyndie Costello from [address] [place] [address] Bobo Youngblood called back and needs to know if Dr. Jatin Madrid is managing pt, if so than has medication orders that she will fax to office. Per Cyndie Costello the wrong provider is on the order therefore needs confirmation.           Last visit 01/07/21 AS

## 2021-02-05 ENCOUNTER — OFFICE VISIT (OUTPATIENT)
Dept: PHYSICAL THERAPY | Facility: HOSPITAL | Age: 61
End: 2021-02-05
Attending: PHYSICIAN ASSISTANT
Payer: COMMERCIAL

## 2021-02-05 PROCEDURE — 97110 THERAPEUTIC EXERCISES: CPT

## 2021-02-05 PROCEDURE — 97116 GAIT TRAINING THERAPY: CPT

## 2021-02-05 NOTE — PROGRESS NOTES
Dx:  Orthopedic aftercare (D83.74)           Insurance (Authorized # of Visits):   [employer] [place] [facility]  [place]12 visits [place])    Authorizing Physician: Dr. Eric Peñaloza MD visit: none scheduled  Fall Risk: standard           Precautions: R LISA Anterior Approach 1 x3 sec hold  -Supine hip abd x15  -Supine SAQ on blue booster 15 x3 sec hold  -Seated LAQ 2 x10 with 3 sec hold  -Seated heel/toe raises x20  -Standing heel/toe raises x15  -Standing marching x15   -Standing hip abd 2 x10 B           Neuro:  -Side stepping

## 2021-02-09 ENCOUNTER — OFFICE VISIT (OUTPATIENT)
Dept: PHYSICAL THERAPY | Facility: HOSPITAL | Age: 61
End: 2021-02-09
Attending: PHYSICIAN ASSISTANT
Payer: COMMERCIAL

## 2021-02-09 DIAGNOSIS — Z47.89 ORTHOPEDIC AFTERCARE: ICD-10-CM

## 2021-02-09 PROCEDURE — 97140 MANUAL THERAPY 1/> REGIONS: CPT

## 2021-02-09 PROCEDURE — 97110 THERAPEUTIC EXERCISES: CPT

## 2021-02-09 NOTE — PROGRESS NOTES
Dx:  Orthopedic aftercare (W28.61)           Insurance (Authorized # of Visits):   Melissa Hernandez [facility]  (12 visits approved)    Authorizing Physician: Dr. Jake Peñaloza MD visit: none scheduled  Fall Risk: standard           Precautions: R LISA Anterior Approach 1 x15  -Standing marching x15   -Standing hip abd 2 x10 B       TherEx:  -Supine heel slide with sliding board - 10  -Hip add isometrics with ball in hooklying - 10x5\"  -Hip abd isometrics with red theraband in hooklying with focus on keeping hip in neutral

## 2021-02-11 ENCOUNTER — OFFICE VISIT (OUTPATIENT)
Dept: PHYSICAL THERAPY | Facility: HOSPITAL | Age: 61
End: 2021-02-11
Attending: PHYSICIAN ASSISTANT
Payer: COMMERCIAL

## 2021-02-11 ENCOUNTER — TELEPHONE (OUTPATIENT)
Dept: ORTHOPEDICS CLINIC | Facility: CLINIC | Age: 61
End: 2021-02-11

## 2021-02-11 PROCEDURE — 97140 MANUAL THERAPY 1/> REGIONS: CPT

## 2021-02-11 PROCEDURE — 97110 THERAPEUTIC EXERCISES: CPT

## 2021-02-11 NOTE — TELEPHONE ENCOUNTER
Please see request below. Asking for a return to work note to start dated 2/10/21. Please advise if this can be written and if she will require any restrictions. LOV 1/20/21 s/p right LISA on 12/29/20. Outpatient physical therapy was ordered.  Plan at that t

## 2021-02-11 NOTE — PROGRESS NOTES
Dx:  Orthopedic aftercare (P88.03)           Insurance (Authorized # of Visits):   [employer] [facility]  (12 visits approved)    Authorizing Physician: Dr. David Peñaloza MD visit: none scheduled  Fall Risk: standard           Precautions: R LISA Anterior Approach 1 #3/12  Date: 2/9/2021  Visit #4/12  Date:2/11/21   TherEX:  -Supine AP, QS, GS x15 ea  -Supine heel slide 15 x3 sec hold  -Supine hip abd x15  -Supine SAQ on blue booster 15 x3 sec hold  -Seated LAQ 2 x10 with 3 sec hold  -Seated heel/toe raises x20  Bruna Travis

## 2021-02-12 NOTE — TELEPHONE ENCOUNTER
/Shahriar    Called pt up and states her hip is still hurting and physical therapy is not helping. Requesting the note to state she can work home remotely.  She has an appt on 2/22/21 for f/u

## 2021-02-16 ENCOUNTER — TELEPHONE (OUTPATIENT)
Dept: PHYSICAL THERAPY | Age: 61
End: 2021-02-16

## 2021-02-16 ENCOUNTER — APPOINTMENT (OUTPATIENT)
Dept: PHYSICAL THERAPY | Facility: HOSPITAL | Age: 61
End: 2021-02-16
Attending: PHYSICIAN ASSISTANT
Payer: COMMERCIAL

## 2021-02-18 ENCOUNTER — OFFICE VISIT (OUTPATIENT)
Dept: PHYSICAL THERAPY | Facility: HOSPITAL | Age: 61
End: 2021-02-18
Attending: PHYSICIAN ASSISTANT
Payer: COMMERCIAL

## 2021-02-18 PROCEDURE — 97112 NEUROMUSCULAR REEDUCATION: CPT

## 2021-02-18 PROCEDURE — 97110 THERAPEUTIC EXERCISES: CPT

## 2021-02-18 NOTE — PROGRESS NOTES
Dx:  Orthopedic aftercare (I20.34)           Insurance (Authorized # of Visits):   [place]ali [place] [facility] [place])    Authorizing Physician: Dr. Eric Hays    Next MD visit: none scheduled  Fall Risk: standard           Precautions: R LISA Anterior Approach 1 Visit #2/12  Date: 2/6/21 Visit #3/12  Date: 2/9/2021  Visit #4/12  Date:2/11/21 Visit #5/12  Date:2/18/21    TherEX:  -Supine AP, QS, GS x15 ea  -Supine heel slide 15 x3 sec hold  -Supine hip abd x15  -Supine SAQ on blue booster 15 x3 sec hold  -Seate to outside R hip and lateral thigh  Gentle mobilization to IT band/lateral quad  Manual:       HEP: hip abd and add isometrics in hooklying added this date     Charges: Ex x1, Nuro x1     Total Timed Treatment: 45  min  Total Treatment Time: 48 min

## 2021-02-22 ENCOUNTER — OFFICE VISIT (OUTPATIENT)
Dept: ORTHOPEDICS CLINIC | Facility: CLINIC | Age: 61
End: 2021-02-22

## 2021-02-22 ENCOUNTER — TELEPHONE (OUTPATIENT)
Dept: ORTHOPEDICS CLINIC | Facility: CLINIC | Age: 61
End: 2021-02-22

## 2021-02-22 DIAGNOSIS — Z47.89 ORTHOPEDIC AFTERCARE: Primary | ICD-10-CM

## 2021-02-22 PROCEDURE — 99024 POSTOP FOLLOW-UP VISIT: CPT | Performed by: ORTHOPAEDIC SURGERY

## 2021-02-22 NOTE — PROGRESS NOTES
NURSING INTAKE COMMENTS: Patient presents with:  Post-Op: s/p right LISA- pt rates her pain 4/10. HPI: This 63year old female presents today follow-up on her right hip.   She is about 7-week status post right total hip arthroplasty through an anterior Sexual Activity      Alcohol use:  Yes        Alcohol/week: 0.0 standard drinks        Frequency: Monthly or less      Drug use: No      Sexual activity: Not on file       Review of Systems:  GENERAL: feels generally well, no significant weight loss or weig March 1 with restrictions. She will come back in a month to assess her progress. Advised to call if any questions or problems arise. The above note was creating using Dragon speech recognition technology. Please excuse any typos.     This note was scri

## 2021-02-22 NOTE — TELEPHONE ENCOUNTER
Pt brought [employer] Forms to  [facility] for Dr Dalton Hughes to complete. Pt stated she previously signed HIPPA and pd $25 fee. Scanned to GREG and brought originals to GREG @ [facility].

## 2021-02-23 ENCOUNTER — MED REC SCAN ONLY (OUTPATIENT)
Dept: ADMINISTRATIVE | Age: 61
End: 2021-02-23

## 2021-02-23 ENCOUNTER — OFFICE VISIT (OUTPATIENT)
Dept: PHYSICAL THERAPY | Facility: HOSPITAL | Age: 61
End: 2021-02-23
Attending: PHYSICIAN ASSISTANT
Payer: COMMERCIAL

## 2021-02-23 PROCEDURE — 97110 THERAPEUTIC EXERCISES: CPT

## 2021-02-23 PROCEDURE — 97140 MANUAL THERAPY 1/> REGIONS: CPT

## 2021-02-23 NOTE — PROGRESS NOTES
Dx:  Orthopedic aftercare (G03.61)           Insurance (Authorized # of Visits):   [employer] [facility]  (12 visits approved)    Authorizing Physician: Dr. Cliff Peñaloza MD visit: none scheduled  Fall Risk: standard           Precautions: R LISA Anterior Approach 1 stretching, strengthening and standing balance and WB activity to increase functional mobility. Discussed patient POC with the PT.      Visit #3/12  Date: 2/9/2021  Visit #4/12  Date:2/11/21 Visit #5/12  Date:2/18/21 Visit: 6/12 2/23/2021   TherEx:  Isaac Soler Dawley Manual:  STM to outside R hip and lateral thigh  Gentle mobilization to IT band/lateral quad  Manual:  Manual:   STM to outside R hip and lateral thigh  Gentle mobilization to IT band/lateral quad      HEP: hip abd and add isometrics in hooklying added thi

## 2021-02-24 NOTE — TELEPHONE ENCOUNTER
Folrentin Easton,     Please sign off on form: [address] the patient and hit \"Chart\" button.   -In Chart Review, w/in the Encounter tab - click 1 time on the Telephone call encounter for 1/7/21 Scroll down the telephone encounter.  -Click \"scan on\" blue H

## 2021-02-25 ENCOUNTER — HOSPITAL ENCOUNTER (OUTPATIENT)
Dept: ULTRASOUND IMAGING | Facility: HOSPITAL | Age: 61
Discharge: HOME | End: 2021-02-25
Attending: FAMILY MEDICINE
Payer: COMMERCIAL

## 2021-02-25 ENCOUNTER — OFFICE VISIT (OUTPATIENT)
Dept: PHYSICAL THERAPY | Facility: HOSPITAL | Age: 61
End: 2021-02-25
Attending: PHYSICIAN ASSISTANT
Payer: COMMERCIAL

## 2021-02-25 DIAGNOSIS — E04.2 MULTIPLE THYROID NODULES: ICD-10-CM

## 2021-02-25 PROCEDURE — 97140 MANUAL THERAPY 1/> REGIONS: CPT

## 2021-02-25 PROCEDURE — 76536 US EXAM OF HEAD AND NECK: CPT | Performed by: FAMILY MEDICINE

## 2021-02-25 PROCEDURE — 97110 THERAPEUTIC EXERCISES: CPT

## 2021-02-25 NOTE — PROGRESS NOTES
Dx:  Orthopedic aftercare (X89.87)           Insurance (Authorized # of Visits):   [facility]  (12 visits approved)    Authorizing Physician: Dr. Elian CRUZ visit: none scheduled  Fall Risk: standard           Precautions: R LISA Anterior Approach 1 functional mobility. Discussed patient POC with the PT.      Visit #3/12  Date: 2/9/2021  Visit #4/12  Date:2/11/21 Visit #5/12  Date:2/18/21 Visit: 6/12 2/23/2021 Visit #7/12  Date:2/25/21    TherEx:  -Supine heel slide with sliding board - 10  -Hip add i patient c/o no pain.     Neuro:  -Side stepping with HR support - 2x10 ft   Neuro:  -Side stepping with HR support - 2x10 ft  -4 inch fwd tap x 10 B Neuro:  -Side stepping with HR support - 2x10 ft  -4 inch fwd/lat tap x 10 B  -Bosu lunges fwd x10 B   Neuro

## 2021-03-02 ENCOUNTER — OFFICE VISIT (OUTPATIENT)
Dept: PHYSICAL THERAPY | Facility: HOSPITAL | Age: 61
End: 2021-03-02
Attending: PHYSICIAN ASSISTANT
Payer: COMMERCIAL

## 2021-03-02 PROCEDURE — 97110 THERAPEUTIC EXERCISES: CPT

## 2021-03-02 PROCEDURE — 97140 MANUAL THERAPY 1/> REGIONS: CPT

## 2021-03-02 NOTE — PROGRESS NOTES
ProgressSummary  Pt has attended 8 visits in Physical Therapy.    Dx:  Orthopedic aftercare (P01.25)           Insurance (Authorized # of Visits):   [facility]  (12 visits approved)    Authorizing Physician: Dr. Jerald Peñaloza MD visit: April 12   Fall R increase ease with standing and walking - progressing   · Pt will demonstrate improved SLS to >30 seconds SKYLER to promote safety and decrease risk of falls on uneven surfaces such as grass - met   · Pt will be able to squat to  light objects around t x15  -OSMAN calf stretch L3, 30sec hold x5  -Standing hip abd 2 x10 B  -Mini Squats x10   TherEx:  -Nustep, level 4 - 5 mins  -Supine SAQ on blue booster (1# ankle weight) - 2x10  -Seated LAQ 15 x3 sec hold  -Step-ups onto 6 in step - 15   -Hip hike on wilfred

## 2021-03-04 ENCOUNTER — OFFICE VISIT (OUTPATIENT)
Dept: PHYSICAL THERAPY | Facility: HOSPITAL | Age: 61
End: 2021-03-04
Attending: PHYSICIAN ASSISTANT
Payer: COMMERCIAL

## 2021-03-04 PROCEDURE — 97110 THERAPEUTIC EXERCISES: CPT

## 2021-03-04 NOTE — PROGRESS NOTES
Dx:  Orthopedic aftercare (H27.46)           Insurance (Authorized # of Visits):   [facility]  (12 visits approved)    Authorizing Physician: Dr. Kristen Peñaloza MD visit: April 12   Fall Risk: standard           Precautions: R LISA Anterior Approach 12/29 Plan: Continue to right hip stabilization, strength and standing balance activity to improved functional mobility.            Visit #7/12  Date:2/25/21 Visit: 8/12  3/2/2021 Visit: 9/12  3/4/2021     TherEx:  -Nustep, level 5 - 5 mins  -Supine SAQ on bl

## 2021-03-09 ENCOUNTER — OFFICE VISIT (OUTPATIENT)
Dept: PHYSICAL THERAPY | Facility: HOSPITAL | Age: 61
End: 2021-03-09
Attending: PHYSICIAN ASSISTANT
Payer: COMMERCIAL

## 2021-03-09 PROCEDURE — 97110 THERAPEUTIC EXERCISES: CPT

## 2021-03-09 PROCEDURE — 97140 MANUAL THERAPY 1/> REGIONS: CPT

## 2021-03-09 NOTE — PROGRESS NOTES
Dx:  Orthopedic aftercare (S07.83)           Insurance (Authorized # of Visits):   [place] [facility] [place]    Authorizing Physician: Dr. Urszula Peñaloza MD visit: April 12   Fall Risk: standard           Precautions: R LISA Anterior Approach 12/29 ambulate without AD with good gait mechanics by end of POC. - progressing     Plan: Continue to right hip stabilization, strength and standing balance activity to improved functional mobility.            Visit #7/12  Date:2/25/21 Visit: 8/12  3/2/2021 Visit thigh  Mobilization to lateral quad/IT band and scar tissue mobilization      HEP: STS, antoinette and samantha added     Charges:  TherEx - 1, Manual - 1  Total Timed Treatment: 32  min  Total Treatment Time: 32 min

## 2021-03-11 ENCOUNTER — OFFICE VISIT (OUTPATIENT)
Dept: PHYSICAL THERAPY | Facility: HOSPITAL | Age: 61
End: 2021-03-11
Attending: PHYSICIAN ASSISTANT
Payer: COMMERCIAL

## 2021-03-11 PROCEDURE — 97110 THERAPEUTIC EXERCISES: CPT

## 2021-03-11 NOTE — PROGRESS NOTES
Dx:  Orthopedic aftercare (U13.01)           Ins[place] (Authorized # of Visits):   [place] [facility] [place]    Authorizing Physician: Dr. Cliff Peñaloza MD visit: April 12   Fall Risk: standard           Precautions: R LISA Anterior Approach 12/29 Visit: 8/12  3/2/2021 Visit: 9/12  3/4/2021 Visit: 10/12  3/9/2021 Visit: 11/12  3/11/2021   TherEx:  -Nustep, level 5 - 5 mins  -Supine SAQ on blue booster (1.5# ankle weight) - 2x10  -Heel slide with 1.5# ankle wt 2x10  -Seated LAQ with 1.5#  15 x3 sec h length Manual:   STM to outside R hip and lateral thigh  Mobilization to lateral quad/IT band and scar tissue mobilization Manual:   Not performed this date Manual:   STM to outside R hip and lateral thigh  Mobilization to lateral quad/IT band and scar tis

## 2021-03-12 ENCOUNTER — APPOINTMENT (OUTPATIENT)
Dept: PHYSICAL THERAPY | Facility: HOSPITAL | Age: 61
End: 2021-03-12
Attending: PHYSICIAN ASSISTANT
Payer: COMMERCIAL

## 2021-03-16 ENCOUNTER — OFFICE VISIT (OUTPATIENT)
Dept: PHYSICAL THERAPY | Facility: HOSPITAL | Age: 61
End: 2021-03-16
Attending: PHYSICIAN ASSISTANT
Payer: COMMERCIAL

## 2021-03-16 PROCEDURE — 97110 THERAPEUTIC EXERCISES: CPT

## 2021-03-16 PROCEDURE — 97140 MANUAL THERAPY 1/> REGIONS: CPT

## 2021-03-16 NOTE — PROGRESS NOTES
Dx:  Orthopedic aftercare (S35.52)           Insurance (Authorized # of Visits):   [facility]  (12 visits approved)    Authorizing Physician: Dr. Carlos Broussard    Next MD visit: April 12   Fall Risk: standard           Precautions: R LISA Anterior Approach 12/29 11/12  3/11/2021 3/16/2021  Tx: 12/12   TherEx:  -Nustep, level 5 - 7 mins  -Leg press DL with 15# 2x20  -Heel raises x15  -4 inch fwd/lat step down x15  -Standing hip abd x15  -Marching with HR support B - x20 B  -Standing right hip isometric 4 x15 sec ho

## 2021-03-23 ENCOUNTER — APPOINTMENT (OUTPATIENT)
Dept: PHYSICAL THERAPY | Facility: HOSPITAL | Age: 61
End: 2021-03-23
Attending: PHYSICIAN ASSISTANT
Payer: COMMERCIAL

## 2021-03-23 ENCOUNTER — TELEPHONE (OUTPATIENT)
Dept: PHYSICAL THERAPY | Facility: HOSPITAL | Age: 61
End: 2021-03-23

## 2021-03-26 ENCOUNTER — TELEPHONE (OUTPATIENT)
Dept: PHYSICAL THERAPY | Facility: HOSPITAL | Age: 61
End: 2021-03-26

## 2021-03-30 ENCOUNTER — OFFICE VISIT (OUTPATIENT)
Dept: PHYSICAL THERAPY | Facility: HOSPITAL | Age: 61
End: 2021-03-30
Attending: PHYSICIAN ASSISTANT
Payer: COMMERCIAL

## 2021-03-30 PROCEDURE — 97140 MANUAL THERAPY 1/> REGIONS: CPT

## 2021-03-30 PROCEDURE — 97110 THERAPEUTIC EXERCISES: CPT

## 2021-03-30 NOTE — PROGRESS NOTES
Dx:  Orthopedic aftercare (Y12.99)           Insurance (Authorized # of Visits):   [facility]  (12 visits approved)    Authorizing Physician: Dr. Manolo Peñaloza MD visit: April 12   Fall Risk: standard           Precautions: R LISA Anterior Approach 12/29 with 25# 2x20  -Leg press SL with 20# x20  -Heel raises x20  -Standing hip abd x20  -Marching with HR support B - x20 B  -Standing right hip isometric 4 x15 sec hold   -Prone hamstring curl (3#) x20  -Supine SAQ on blue booster (3# ankle weight) - 2x20  -B

## 2021-04-01 ENCOUNTER — APPOINTMENT (OUTPATIENT)
Dept: GENERAL RADIOLOGY | Facility: HOSPITAL | Age: 61
End: 2021-04-01
Attending: NURSE PRACTITIONER
Payer: COMMERCIAL

## 2021-04-01 ENCOUNTER — APPOINTMENT (OUTPATIENT)
Dept: CT IMAGING | Facility: HOSPITAL | Age: 61
End: 2021-04-01
Attending: NURSE PRACTITIONER
Payer: COMMERCIAL

## 2021-04-01 ENCOUNTER — HOSPITAL ENCOUNTER (EMERGENCY)
Facility: HOSPITAL | Age: 61
Discharge: HOME | End: 2021-04-01
Payer: COMMERCIAL

## 2021-04-01 VITALS
HEART RATE: 72 BPM | OXYGEN SATURATION: 100 % | RESPIRATION RATE: 16 BRPM | TEMPERATURE: 98 F | DIASTOLIC BLOOD PRESSURE: 83 MMHG | SYSTOLIC BLOOD PRESSURE: 125 MMHG

## 2021-04-01 DIAGNOSIS — S16.1XXA STRAIN OF NECK MUSCLE, INITIAL ENCOUNTER: ICD-10-CM

## 2021-04-01 DIAGNOSIS — S70.01XA CONTUSION OF RIGHT HIP, INITIAL ENCOUNTER: ICD-10-CM

## 2021-04-01 DIAGNOSIS — G44.311 INTRACTABLE ACUTE POST-TRAUMATIC HEADACHE: ICD-10-CM
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
## 2021-04-01 PROCEDURE — 99284 EMERGENCY DEPT VISIT MOD MDM: CPT

## 2021-04-01 PROCEDURE — 72125 CT NECK SPINE W/O DYE: CPT | Performed by: NURSE PRACTITIONER

## 2021-04-01 PROCEDURE — 70450 CT HEAD/BRAIN W/O DYE: CPT | Performed by: NURSE PRACTITIONER

## 2021-04-01 PROCEDURE — 73502 X-RAY EXAM HIP UNI 2-3 VIEWS: CPT | Performed by: NURSE PRACTITIONER

## 2021-04-01 RX ORDER — ACETAMINOPHEN 325 MG/1
650 TABLET ORAL ONCE
Status: COMPLETED | OUTPATIENT
Start: 2021-04-01 | End: 2021-04-01
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
## 2021-04-02 NOTE — ED QUICK NOTES
Assumed care of patient from [facility]. Patient is alert, oriented x4, breathing with ease, in no apparent distress. Patient currently rating pain improved at a 2/10.

## 2021-04-02 NOTE — ED QUICK NOTES
Reviewed discharge information with patient. Patient verbalized understanding, no further questions or complaints at this time. Patient is alert and orientated x4, in no apparent distress, ambulating with steady gait with cane. No IV in.  Instructed patient

## 2021-04-06 ENCOUNTER — OFFICE VISIT (OUTPATIENT)
Dept: PHYSICAL THERAPY | Facility: HOSPITAL | Age: 61
End: 2021-04-06
Attending: PHYSICIAN ASSISTANT
Payer: COMMERCIAL

## 2021-04-06 PROCEDURE — 97140 MANUAL THERAPY 1/> REGIONS: CPT

## 2021-04-06 PROCEDURE — 97110 THERAPEUTIC EXERCISES: CPT

## 2021-04-06 NOTE — PROGRESS NOTES
Dx:  Orthopedic aftercare (C22.43)           Ins[place] (Authorized # of Visits):   [place] [facility] [place]    Authorizing Physician: Dr. Osmani Nascimento    Next MD visit: April 12   Fall Risk: standard           Precautions: R LISA Anterior Approach 12/29 has one approved session, need more visits for approval.           Visit: 11/12  3/11/2021 3/16/2021  Tx: 12/12 3/30/2021  Tx: 13/18 4/6/2021  Tx: 14/18   TherEx:  -Nustep, level 5 - 7 mins  -Leg press DL with 25# 2x20  -Leg press SL with 20# x20  -Heel ra

## 2021-04-12 ENCOUNTER — OFFICE VISIT (OUTPATIENT)
Dept: ORTHOPEDICS CLINIC | Facility: CLINIC | Age: 61
End: 2021-04-12

## 2021-04-12 VITALS — HEIGHT: 66.5 IN | WEIGHT: 185 LBS | BODY MASS INDEX: 29.38 KG/M2

## 2021-04-12 DIAGNOSIS — Z47.89 ORTHOPEDIC AFTERCARE: Primary | ICD-10-CM

## 2021-04-12 PROCEDURE — 3008F BODY MASS INDEX DOCD: CPT | Performed by: ORTHOPAEDIC SURGERY

## 2021-04-12 PROCEDURE — 99213 OFFICE O/P EST LOW 20 MIN: CPT | Performed by: ORTHOPAEDIC SURGERY

## 2021-04-12 NOTE — PROGRESS NOTES
NURSING INTAKE COMMENTS: Patient presents with:  Hip Pain: pt in for f/u Right side hip pain, pain mild off and on      HPI: This 57year old female presents today here for follow-up after right total hip arthroplasty.   Her surgery was just over 3 months a • Breast Cancer Maternal Cousin Female 39       Social History    Occupational History      Not on file    Tobacco Use      Smoking status: Never Smoker      Smokeless tobacco: Never Used    Substance and Sexual Activity      Alcohol use:  Yes        [place] SPACES: Ventricles, cisterns, and sulci are appropriate for age. No hydrocephalus, subarachnoid hemorrhage, or mass. No midline shift. CEREBRUM: No edema, hemorrhage, mass, acute infarction, or significant atrophy. WHITE MATTER: Normal white matter.   C C3-C4: Mild central bulge with minimal thecal sac effacement. C4-C5: Broad disc osteophyte complex with ventral thecal sac effacement and bilateral foraminal encroachment.   Partial ossification of the posterior longitudinal ligament at the C5 level contrib spondylosis. SOFT TISSUES: Negative. No visible soft tissue swelling. EFFUSION: None visible. OTHER: Negative. CONCLUSION:  1. No acute fracture dislocation. 2. Right hip prosthesis in anatomic position.  3. Moderate osteoarthritis left hip    Dicta

## 2021-04-12 NOTE — TELEPHONE ENCOUNTER
Pt brought reasonable accommodation forms to [address] for Dr Kayy Chandler to complete. Scanned to Forms, took orig to GREG.

## 2021-04-12 NOTE — TELEPHONE ENCOUNTER
ADA form received in forms department, logged for processing, sent pt a [employer] message in regards to missing HIPPA for [place]

## 2021-04-13 ENCOUNTER — OFFICE VISIT (OUTPATIENT)
Dept: PHYSICAL THERAPY | Facility: HOSPITAL | Age: 61
End: 2021-04-13
Attending: PHYSICIAN ASSISTANT
Payer: COMMERCIAL

## 2021-04-13 PROCEDURE — 97110 THERAPEUTIC EXERCISES: CPT

## 2021-04-13 PROCEDURE — 97140 MANUAL THERAPY 1/> REGIONS: CPT

## 2021-04-13 NOTE — PROGRESS NOTES
Dx:  Orthopedic aftercare (F23.21)           Insurance (Authorized # of Visits):   [facility]  (12 visits approved)    Authorizing Physician: Dr. Kathe Phillips    Next MD visit: April 12   Fall Risk: standard           Precautions: R LISA Anterior Approach 12/29 3/30/2021  Tx: 13/18 4/6/2021  Tx: 14/18 4/13/2021  Tx: 15/18   TherEx:  -Nustep, level 5 - 6 mins  -Leg press DL with 35# - 2x15  -Supine SAQ on blue bolster (3# ankle weight) - 2x20  -Bridges with fitness [place] - 2x15  TherEx:  -Nustep, level 5 - 6 mins

## 2021-04-19 NOTE — TELEPHONE ENCOUNTER
Carlota Reyes,     Please sign off on form: ADA  -Highlight the patient and hit \"Chart\" button.   -In Chart Review, w/in the Encounter tab - click 1 time on the Telephone call encounter for 2/22/21 Scroll down the telephone encounter.  -Click \"scan on\" bl

## 2021-04-20 ENCOUNTER — OFFICE VISIT (OUTPATIENT)
Dept: PHYSICAL THERAPY | Facility: HOSPITAL | Age: 61
End: 2021-04-20
Attending: PHYSICIAN ASSISTANT
Payer: COMMERCIAL

## 2021-04-20 PROCEDURE — 97140 MANUAL THERAPY 1/> REGIONS: CPT

## 2021-04-20 PROCEDURE — 97110 THERAPEUTIC EXERCISES: CPT

## 2021-04-20 NOTE — PROGRESS NOTES
Dx:  Orthopedic aftercare (W02.10)           Insurance (Authorized # of Visits):   [facility]  (12 visits approved)    Authorizing Physician: Dr. Kathe Phillips    Next MD visit: April 12   Fall Risk: standard           Precautions: R LISA Anterior Approach 12/29 14/18 4/13/2021  Tx: 15/18 4/20/2021  Tx: 16/18   TherEx:  -Nustep, level 5 - 6 mins  -Leg press DL with 40# - 2x15  -LAQ's with 2.5# - 2x15, 3\" holds  -Step ups with knee  onto 6 in step - 2x10 B    TherEx:  -Nustep, level 5 - 7 mins  -Leg press DL

## 2021-04-27 ENCOUNTER — OFFICE VISIT (OUTPATIENT)
Dept: PHYSICAL THERAPY | Facility: HOSPITAL | Age: 61
End: 2021-04-27
Attending: PHYSICIAN ASSISTANT
Payer: COMMERCIAL

## 2021-04-27 PROCEDURE — 97110 THERAPEUTIC EXERCISES: CPT

## 2021-04-27 NOTE — PROGRESS NOTES
Dx:  Orthopedic aftercare (P10.29)           Insurance (Authorized # of Visits):   [facility]  (12 visits approved)    Authorizing Physician: Dr. Ricky Lea    Next MD visit: April 12   Fall Risk: standard           Precautions: R LISA Anterior Approach 12/29 7 mins  -Leg press DL with 40# - 3x20  -LAQ's with 3# - 2x15, 3\" holds  -Step ups with knee  onto 6 in step - 15 B  TherEx:  -Nustep, level 5 - 7 mins  -Leg press DL with 40# - 3x20  -LAQ's with 4# - 3x15, 3\" holds  -Step ups with knee  onto

## 2021-05-04 ENCOUNTER — OFFICE VISIT (OUTPATIENT)
Dept: PHYSICAL THERAPY | Facility: HOSPITAL | Age: 61
End: 2021-05-04
Attending: PHYSICIAN ASSISTANT
Payer: COMMERCIAL

## 2021-05-04 PROCEDURE — 97110 THERAPEUTIC EXERCISES: CPT

## 2021-05-04 NOTE — PROGRESS NOTES
Nadir  Pt has attended 18 visits in Physical Therapy.      Dx:  Orthopedic aftercare (C63.34)           Insurance (Authorized # of Visits):   [facility]  (12 visits approved)    Authorizing Physician: Dr. Nicole Peñaloza MD visit: April 12   Fa compliant with comprehensive HEP to maintain progress achieved in PT - met   · Pt will be able to ambulate without AD with good gait mechanics by end of POC.  - met    Plan: D/c pt with HEP     4/13/2021  Tx: 15/18 4/20/2021  Tx: 16/18 4/27/2021  Tx: 17/18

## 2021-06-08 ENCOUNTER — TELEPHONE (OUTPATIENT)
Dept: FAMILY MEDICINE CLINIC | Facility: CLINIC | Age: 61
End: 2021-06-08

## 2021-06-08 NOTE — TELEPHONE ENCOUNTER
Patient is trying to look for her colonoscopy referral that was given to her. She said it might have .

## 2021-07-12 ENCOUNTER — OFFICE VISIT (OUTPATIENT)
Dept: ORTHOPEDICS CLINIC | Facility: CLINIC | Age: 61
End: 2021-07-12

## 2021-07-12 ENCOUNTER — HOSPITAL ENCOUNTER (OUTPATIENT)
Dept: GENERAL RADIOLOGY | Facility: HOSPITAL | Age: 61
Discharge: HOME | End: 2021-07-12
Attending: ORTHOPAEDIC SURGERY
Payer: COMMERCIAL

## 2021-07-12 DIAGNOSIS — Z47.89 ORTHOPEDIC AFTERCARE: Primary | ICD-10-CM

## 2021-07-12 DIAGNOSIS — Z47.89 ORTHOPEDIC AFTERCARE: ICD-10-CM

## 2021-07-12 PROCEDURE — 73502 X-RAY EXAM HIP UNI 2-3 VIEWS: CPT | Performed by: ORTHOPAEDIC SURGERY

## 2021-07-12 PROCEDURE — 99212 OFFICE O/P EST SF 10 MIN: CPT | Performed by: PHYSICIAN ASSISTANT

## 2021-07-12 NOTE — PROGRESS NOTES
NURSING INTAKE COMMENTS: Patient presents with:  Post-Op: R hip Arthroplasy, denies any pain, done with PT      HPI: This 57year old female presents today for a 6-month follow-up after right total hip arthroplasty. She is doing very well.   She has no noé drinks      Drug use: No      Sexual activity: Not on file       Review of Systems:  GENERAL: feels generally well, no significant weight loss or weight gain  SKIN: no ulcerated or worrisome skin lesions  EYES:denies blurred vision or double vision  HEENT: discussed her arthritis in her left hip. She is not ready to consider any surgery on that side at this point. We will see her back in 6 months for a 1 year follow-up on her right hip.   I advised her to come in sooner if she had any worsening of symptoms

## 2021-07-14 ENCOUNTER — LAB ENCOUNTER (OUTPATIENT)
Dept: LAB | Age: 61
End: 2021-07-14
Attending: INTERNAL MEDICINE
Payer: COMMERCIAL

## 2021-07-14 DIAGNOSIS — Z86.010 PERSONAL HISTORY OF COLONIC POLYPS: Primary | ICD-10-CM

## 2021-07-14 PROCEDURE — 88305 TISSUE EXAM BY PATHOLOGIST: CPT

## 2021-07-15 NOTE — PROGRESS NOTES
7/15/2021  [address] [id number]    Dear Minh Delcid,     Here are the results from your recent testing :    During your colonoscopy a polyp was removed. The pathology showed that this polyp was an adenoma.   This kind of campos

## 2021-12-30 ENCOUNTER — OFFICE VISIT (OUTPATIENT)
Dept: FAMILY MEDICINE CLINIC | Facility: CLINIC | Age: 61
End: 2021-12-30
Payer: COMMERCIAL

## 2021-12-30 VITALS
HEIGHT: 66.5 IN | OXYGEN SATURATION: 98 % | SYSTOLIC BLOOD PRESSURE: 124 MMHG | HEART RATE: 84 BPM | DIASTOLIC BLOOD PRESSURE: 76 MMHG | WEIGHT: 198 LBS | BODY MASS INDEX: 31.44 KG/M2

## 2021-12-30 DIAGNOSIS — Z12.31 SCREENING MAMMOGRAM, ENCOUNTER FOR: ICD-10-CM

## 2021-12-30 DIAGNOSIS — Z23 NEED FOR VACCINATION: ICD-10-CM

## 2021-12-30 DIAGNOSIS — R91.1 PULMONARY NODULE 1 CM OR GREATER IN DIAMETER: ICD-10-CM

## 2021-12-30 DIAGNOSIS — R20.0 BILATERAL HAND NUMBNESS: ICD-10-CM

## 2021-12-30 DIAGNOSIS — Z00.00 ENCOUNTER FOR ROUTINE ADULT HEALTH EXAMINATION WITHOUT ABNORMAL FINDINGS: Primary | ICD-10-CM

## 2021-12-30 DIAGNOSIS — E04.2 MULTIPLE THYROID NODULES: ICD-10-CM

## 2021-12-30 PROCEDURE — 99213 OFFICE O/P EST LOW 20 MIN: CPT | Performed by: FAMILY MEDICINE

## 2021-12-30 PROCEDURE — 3078F DIAST BP <80 MM HG: CPT | Performed by: FAMILY MEDICINE

## 2021-12-30 PROCEDURE — 90686 IIV4 VACC NO PRSV 0.5 ML IM: CPT | Performed by: FAMILY MEDICINE

## 2021-12-30 PROCEDURE — 90750 HZV VACC RECOMBINANT IM: CPT | Performed by: FAMILY MEDICINE

## 2021-12-30 PROCEDURE — 99396 PREV VISIT EST AGE 40-64: CPT | Performed by: FAMILY MEDICINE

## 2021-12-30 PROCEDURE — 90472 IMMUNIZATION ADMIN EACH ADD: CPT | Performed by: FAMILY MEDICINE

## 2021-12-30 PROCEDURE — 90471 IMMUNIZATION ADMIN: CPT | Performed by: FAMILY MEDICINE

## 2021-12-30 PROCEDURE — 3008F BODY MASS INDEX DOCD: CPT | Performed by: FAMILY MEDICINE

## 2021-12-30 PROCEDURE — 3074F SYST BP LT 130 MM HG: CPT | Performed by: FAMILY MEDICINE

## 2021-12-30 NOTE — PATIENT INSTRUCTIONS

## 2021-12-30 NOTE — PROGRESS NOTES
HPI:   Johnathan Torres is a 57year old female who presents for a complete physical exam.     Right hip feels great since her hip replacement but now having more pain in her left hip and contemplating having surgery on this hip.    Has been having some num • acetaminophen 500 MG Oral Tab Take 500 mg by mouth every 6 (six) hours as needed for Pain. • Cholecalciferol (VITAMIN D-3 OR) Take by mouth.        No Known Allergies   Past Medical History:   Diagnosis Date   • Disorder of thyroid     nodules, gloria Date)   SpO2 98%   BMI 31.48 kg/m²     GENERAL: well developed, well nourished, in no apparent distress   SKIN: no rashes, no suspicious lesions  HEENT: atraumatic, normocephalic, bilateral TM's normal   EYES: PERRLA, EOMI, conjunctiva are clear  NECK: sup (Glycohemoglobin) [E]      Comp Metabolic Panel (14) [E]    Due for repeat CT chest to follow-up on previous pulmonary nodule to document stability.    Also due for repeat thyroid ultrasound in February to ensure nodules stable  Bilateral hand numbness poss

## 2022-01-06 ENCOUNTER — LAB ENCOUNTER (OUTPATIENT)
Dept: LAB | Facility: REFERENCE LAB | Age: 62
End: 2022-01-06
Attending: FAMILY MEDICINE
Payer: COMMERCIAL

## 2022-01-06 ENCOUNTER — HOSPITAL ENCOUNTER (OUTPATIENT)
Dept: MAMMOGRAPHY | Age: 62
Discharge: HOME | End: 2022-01-06
Attending: FAMILY MEDICINE
Payer: COMMERCIAL

## 2022-01-06 DIAGNOSIS — Z00.00 ENCOUNTER FOR ROUTINE ADULT HEALTH EXAMINATION WITHOUT ABNORMAL FINDINGS: ICD-10-CM

## 2022-01-06 DIAGNOSIS — Z12.31 SCREENING MAMMOGRAM, ENCOUNTER FOR: ICD-10-CM

## 2022-01-06 LAB
ALBUMIN SERPL-MCNC: 3.8 G/DL (ref 3.4–5)
ALBUMIN/GLOB SERPL: 0.9 {RATIO} (ref 1–2)
ALP LIVER SERPL-CCNC: 76 U/L
ALT SERPL-CCNC: 16 U/L
ANION GAP SERPL CALC-SCNC: 5 MMOL/L (ref 0–18)
AST SERPL-CCNC: 13 U/L (ref 15–37)
BASOPHILS # BLD AUTO: 0.06 X10(3) UL (ref 0–0.2)
BASOPHILS NFR BLD AUTO: 1 %
BILIRUB SERPL-MCNC: 0.4 MG/DL (ref 0.1–2)
BUN BLD-MCNC: 14 MG/DL (ref 7–18)
BUN/CREAT SERPL: 14.6 (ref 10–20)
CALCIUM BLD-MCNC: 9.7 MG/DL (ref 8.5–10.1)
CHLORIDE SERPL-SCNC: 108 MMOL/L (ref 98–112)
CHOLEST SERPL-MCNC: 179 MG/DL (ref ?–200)
CO2 SERPL-SCNC: 27 MMOL/L (ref 21–32)
CREAT BLD-MCNC: 0.96 MG/DL
DEPRECATED RDW RBC AUTO: 43.6 FL (ref 35.1–46.3)
EOSINOPHIL # BLD AUTO: 0.13 X10(3) UL (ref 0–0.7)
EOSINOPHIL NFR BLD AUTO: 2.2 %
ERYTHROCYTE [DISTWIDTH] IN BLOOD BY AUTOMATED COUNT: 13.2 % (ref 11–15)
EST. AVERAGE GLUCOSE BLD GHB EST-MCNC: 117 MG/DL (ref 68–126)
FASTING PATIENT LIPID ANSWER: YES
FASTING STATUS PATIENT QL REPORTED: YES
GLOBULIN PLAS-MCNC: 4.1 G/DL (ref 2.8–4.4)
GLUCOSE BLD-MCNC: 85 MG/DL (ref 70–99)
HBA1C MFR BLD: 5.7 % (ref ?–5.7)
HCT VFR BLD AUTO: 41.1 %
HDLC SERPL-MCNC: 71 MG/DL (ref 40–59)
HGB BLD-MCNC: 12.8 G/DL
IMM GRANULOCYTES # BLD AUTO: 0.02 X10(3) UL (ref 0–1)
IMM GRANULOCYTES NFR BLD: 0.3 %
LDLC SERPL CALC-MCNC: 95 MG/DL (ref ?–100)
LYMPHOCYTES # BLD AUTO: 2.66 X10(3) UL (ref 1–4)
LYMPHOCYTES NFR BLD AUTO: 45.2 %
MCH RBC QN AUTO: 28 PG (ref 26–34)
MCHC RBC AUTO-ENTMCNC: 31.1 G/DL (ref 31–37)
MCV RBC AUTO: 89.9 FL
MONOCYTES # BLD AUTO: 0.39 X10(3) UL (ref 0.1–1)
MONOCYTES NFR BLD AUTO: 6.6 %
NEUTROPHILS # BLD AUTO: 2.62 X10 (3) UL (ref 1.5–7.7)
NEUTROPHILS # BLD AUTO: 2.62 X10(3) UL (ref 1.5–7.7)
NEUTROPHILS NFR BLD AUTO: 44.7 %
NONHDLC SERPL-MCNC: 108 MG/DL (ref ?–130)
OSMOLALITY SERPL CALC.SUM OF ELEC: 290 MOSM/KG (ref 275–295)
PLATELET # BLD AUTO: 333 10(3)UL (ref 150–450)
POTASSIUM SERPL-SCNC: 3.9 MMOL/L (ref 3.5–5.1)
PROT SERPL-MCNC: 7.9 G/DL (ref 6.4–8.2)
RBC # BLD AUTO: 4.57 X10(6)UL
SODIUM SERPL-SCNC: 140 MMOL/L (ref 136–145)
TRIGL SERPL-MCNC: 71 MG/DL (ref 30–149)
VLDLC SERPL CALC-MCNC: 12 MG/DL (ref 0–30)
WBC # BLD AUTO: 5.9 X10(3) UL (ref 4–11)

## 2022-01-06 PROCEDURE — 85025 COMPLETE CBC W/AUTO DIFF WBC: CPT

## 2022-01-06 PROCEDURE — 80061 LIPID PANEL: CPT

## 2022-01-06 PROCEDURE — 80053 COMPREHEN METABOLIC PANEL: CPT

## 2022-01-06 PROCEDURE — 77063 BREAST TOMOSYNTHESIS BI: CPT | Performed by: FAMILY MEDICINE

## 2022-01-06 PROCEDURE — 36415 COLL VENOUS BLD VENIPUNCTURE: CPT

## 2022-01-06 PROCEDURE — 83036 HEMOGLOBIN GLYCOSYLATED A1C: CPT

## 2022-01-06 PROCEDURE — 77067 SCR MAMMO BI INCL CAD: CPT | Performed by: FAMILY MEDICINE

## 2022-01-07 ENCOUNTER — HOSPITAL ENCOUNTER (OUTPATIENT)
Dept: CT IMAGING | Facility: HOSPITAL | Age: 62
Discharge: HOME | End: 2022-01-07
Attending: FAMILY MEDICINE
Payer: COMMERCIAL

## 2022-01-07 DIAGNOSIS — R91.1 PULMONARY NODULE 1 CM OR GREATER IN DIAMETER: ICD-10-CM

## 2022-01-07 DIAGNOSIS — R91.8 PULMONARY NODULES: Primary | ICD-10-CM

## 2022-01-07 PROCEDURE — 71250 CT THORAX DX C-: CPT | Performed by: FAMILY MEDICINE

## 2022-01-18 ENCOUNTER — OFFICE VISIT (OUTPATIENT)
Dept: OBGYN CLINIC | Facility: CLINIC | Age: 62
End: 2022-01-18
Payer: COMMERCIAL

## 2022-01-18 VITALS
HEIGHT: 66.5 IN | BODY MASS INDEX: 31.07 KG/M2 | SYSTOLIC BLOOD PRESSURE: 126 MMHG | WEIGHT: 195.63 LBS | DIASTOLIC BLOOD PRESSURE: 80 MMHG

## 2022-01-18 DIAGNOSIS — Z01.419 WOMEN'S ANNUAL ROUTINE GYNECOLOGICAL EXAMINATION: Primary | ICD-10-CM

## 2022-01-18 PROCEDURE — 99396 PREV VISIT EST AGE 40-64: CPT | Performed by: OBSTETRICS & GYNECOLOGY

## 2022-01-18 PROCEDURE — 3074F SYST BP LT 130 MM HG: CPT | Performed by: OBSTETRICS & GYNECOLOGY

## 2022-01-18 PROCEDURE — 3079F DIAST BP 80-89 MM HG: CPT | Performed by: OBSTETRICS & GYNECOLOGY

## 2022-01-18 PROCEDURE — 3008F BODY MASS INDEX DOCD: CPT | Performed by: OBSTETRICS & GYNECOLOGY

## 2022-01-18 PROCEDURE — 87624 HPV HI-RISK TYP POOLED RSLT: CPT | Performed by: OBSTETRICS & GYNECOLOGY

## 2022-01-18 PROCEDURE — 88175 CYTOPATH C/V AUTO FLUID REDO: CPT | Performed by: OBSTETRICS & GYNECOLOGY

## 2022-01-18 RX ORDER — MELATONIN 10 MG
CAPSULE ORAL
COMMUNITY

## 2022-01-18 NOTE — PROGRESS NOTES
GYN ANNUAL    2022  8:15 AM    Patient presents with: Annual  .    HPI: Patient is a 57year old  LMP age 47 presents for annual gyn exam and PAP. Cycles remain absent. Reports no gynecologic issues or complaints. No pelvic pain.  No abnormal v ADJACENT VAGINA; W/BIOPSY(S), CERVIX  2012   • HYSTEROSCOPY  08/13/2015    D&C    • LASER SURGERY OF CERVIX  1988    cone bx   • TOTAL HIP REPLACEMENT Right 12/29/2020   • TUBAL LIGATION  1992     No Known Allergies  Family History   Problem Relation Age o hemorrhoids  EXTREMITIES: nontender without edema      A/P: Patient is 57year old female here for well-woman exam.     1. Women's annual routine gynecological examination  - PAP done  - Return annually, sooner if problems        1/18/2022  Kathy Cooper

## 2022-01-19 LAB — HPV I/H RISK 1 DNA SPEC QL NAA+PROBE: NEGATIVE

## 2022-01-27 ENCOUNTER — TELEPHONE (OUTPATIENT)
Dept: OBGYN CLINIC | Facility: CLINIC | Age: 62
End: 2022-01-27

## 2022-01-27 NOTE — TELEPHONE ENCOUNTER
----- Message from Moraima Avelar MD sent at 1/26/2022 11:22 AM CST -----  PAP normal, HPV negative.       Moraima Avelar MD

## 2022-03-25 ENCOUNTER — NURSE ONLY (OUTPATIENT)
Dept: FAMILY MEDICINE CLINIC | Facility: CLINIC | Age: 62
End: 2022-03-25
Payer: COMMERCIAL

## 2022-03-25 ENCOUNTER — HOSPITAL ENCOUNTER (OUTPATIENT)
Dept: ULTRASOUND IMAGING | Age: 62
Discharge: HOME | End: 2022-03-25
Attending: FAMILY MEDICINE
Payer: COMMERCIAL

## 2022-03-25 DIAGNOSIS — E04.2 MULTIPLE THYROID NODULES: ICD-10-CM

## 2022-03-25 PROCEDURE — 90750 HZV VACC RECOMBINANT IM: CPT | Performed by: FAMILY MEDICINE

## 2022-03-25 PROCEDURE — 90471 IMMUNIZATION ADMIN: CPT | Performed by: FAMILY MEDICINE

## 2022-03-25 PROCEDURE — 76536 US EXAM OF HEAD AND NECK: CPT | Performed by: FAMILY MEDICINE

## 2022-03-25 NOTE — PROGRESS NOTES
Zoster Vaccine Recombinant Adjuvanted (Shingrix) injection completed. Injection site Right Arm IM. Patient tolerated well without complications.

## 2022-05-04 ENCOUNTER — TELEPHONE (OUTPATIENT)
Dept: ORTHOPEDICS CLINIC | Facility: CLINIC | Age: 62
End: 2022-05-04

## 2022-05-04 NOTE — TELEPHONE ENCOUNTER
Left message for patient to give our office a call back. Patient will need antibiotics for 2 years following surgery. Patient had surgery 12/29/2020.

## 2022-05-04 NOTE — TELEPHONE ENCOUNTER
Per pt was told that prior to having dental appt she needed to have antibiotic prescribed by Dr. Nathan Molina due to surgery in 2020. Pt asking if this will have to be done every time she sees the dentist? Please call thank you.

## 2024-11-07 ENCOUNTER — OFFICE VISIT (OUTPATIENT)
Facility: CLINIC | Age: 64
End: 2024-11-07
Payer: MEDICARE

## 2024-11-07 ENCOUNTER — LAB ENCOUNTER (OUTPATIENT)
Dept: LAB | Facility: REFERENCE LAB | Age: 64
End: 2024-11-07
Attending: FAMILY MEDICINE
Payer: MEDICARE

## 2024-11-07 VITALS
WEIGHT: 188 LBS | OXYGEN SATURATION: 99 % | BODY MASS INDEX: 30.22 KG/M2 | HEART RATE: 80 BPM | DIASTOLIC BLOOD PRESSURE: 72 MMHG | HEIGHT: 66 IN | SYSTOLIC BLOOD PRESSURE: 126 MMHG

## 2024-11-07 DIAGNOSIS — Z12.31 VISIT FOR SCREENING MAMMOGRAM: ICD-10-CM

## 2024-11-07 DIAGNOSIS — R73.03 PREDIABETES: ICD-10-CM

## 2024-11-07 DIAGNOSIS — Z23 NEED FOR INFLUENZA VACCINATION: ICD-10-CM

## 2024-11-07 DIAGNOSIS — Z00.00 MEDICARE ANNUAL WELLNESS VISIT, INITIAL: Primary | ICD-10-CM

## 2024-11-07 DIAGNOSIS — Z12.4 SCREENING FOR CERVICAL CANCER: ICD-10-CM

## 2024-11-07 DIAGNOSIS — E04.2 MULTIPLE THYROID NODULES: ICD-10-CM

## 2024-11-07 DIAGNOSIS — Z78.0 ASYMPTOMATIC MENOPAUSAL STATE: ICD-10-CM

## 2024-11-07 DIAGNOSIS — R91.1 PULMONARY NODULE 1 CM OR GREATER IN DIAMETER: ICD-10-CM

## 2024-11-07 DIAGNOSIS — M16.0 OSTEOARTHRITIS OF BOTH HIPS, UNSPECIFIED OSTEOARTHRITIS TYPE: ICD-10-CM

## 2024-11-07 DIAGNOSIS — T75.3XXS MOTION SICKNESS, SEQUELA: ICD-10-CM

## 2024-11-07 DIAGNOSIS — F51.01 PRIMARY INSOMNIA: ICD-10-CM

## 2024-11-07 PROBLEM — Z01.419 WOMEN'S ANNUAL ROUTINE GYNECOLOGICAL EXAMINATION: Status: RESOLVED | Noted: 2018-06-04 | Resolved: 2024-11-07

## 2024-11-07 PROBLEM — Z01.818 PREOP TESTING: Status: RESOLVED | Noted: 2018-06-04 | Resolved: 2024-11-07

## 2024-11-07 PROBLEM — T75.3XXA MOTION SICKNESS: Status: ACTIVE | Noted: 2024-11-07

## 2024-11-07 PROBLEM — R10.30 LOWER ABDOMINAL PAIN: Status: RESOLVED | Noted: 2019-11-18 | Resolved: 2024-11-07

## 2024-11-07 PROBLEM — M16.11 PRIMARY OSTEOARTHRITIS OF RIGHT HIP: Status: RESOLVED | Noted: 2020-12-29 | Resolved: 2024-11-07

## 2024-11-07 PROCEDURE — 87624 HPV HI-RISK TYP POOLED RSLT: CPT | Performed by: FAMILY MEDICINE

## 2024-11-07 PROCEDURE — 88175 CYTOPATH C/V AUTO FLUID REDO: CPT | Performed by: FAMILY MEDICINE

## 2024-11-07 PROCEDURE — 99213 OFFICE O/P EST LOW 20 MIN: CPT | Performed by: FAMILY MEDICINE

## 2024-11-07 PROCEDURE — 90662 IIV NO PRSV INCREASED AG IM: CPT | Performed by: FAMILY MEDICINE

## 2024-11-07 PROCEDURE — 96160 PT-FOCUSED HLTH RISK ASSMT: CPT | Performed by: FAMILY MEDICINE

## 2024-11-07 PROCEDURE — 83036 HEMOGLOBIN GLYCOSYLATED A1C: CPT

## 2024-11-07 PROCEDURE — 36415 COLL VENOUS BLD VENIPUNCTURE: CPT

## 2024-11-07 PROCEDURE — G0402 INITIAL PREVENTIVE EXAM: HCPCS | Performed by: FAMILY MEDICINE

## 2024-11-07 PROCEDURE — G0008 ADMIN INFLUENZA VIRUS VAC: HCPCS | Performed by: FAMILY MEDICINE

## 2024-11-07 RX ORDER — DIPHENHYDRAMINE HYDROCHLORIDE 25 MG/1
25 CAPSULE ORAL NIGHTLY PRN
COMMUNITY

## 2024-11-07 RX ORDER — SCOLOPAMINE TRANSDERMAL SYSTEM 1 MG/1
1 PATCH, EXTENDED RELEASE TRANSDERMAL
Qty: 24 PATCH | Refills: 1 | Status: SHIPPED | OUTPATIENT
Start: 2024-11-07

## 2024-11-07 RX ORDER — SCOLOPAMINE TRANSDERMAL SYSTEM 1 MG/1
1 PATCH, EXTENDED RELEASE TRANSDERMAL
COMMUNITY
Start: 2023-10-25 | End: 2024-11-07

## 2024-11-07 NOTE — PROGRESS NOTES
Subjective:   Yuliana Torres is a 64 year old female who presents for a MA AHA (Medicare Advantage Annual Health Assessment) and IPPE (Initial Preventative Physical Exam) (Welcome to Medicare- < 12 months on Medicare) and follow up      Patient does have history of prediabetes. Last A1c was 5.8 in 3/2024. Patient does watch diet and exercises. Patient does have silver sneakers and goes the gym. Walks. Does water aerobics and lifts weights. There is some family history of DM more in maternal cousins.     Colon cancer screening: Did cologuard in 2024 and was normal. Of note patient did have colonoscopy prior to this that demonstrated polyp.     Thyroid nodule and pulmonary nodule  Patient does recall getting US thyroid and CT chest done in around 3/2024 at [facility]  Patient takes banophen once every 2 weeks if that as needed for sleep doing well.     Osteoarthritis of the hips  Patient has had right hip replacement. Is likely going to see ortho in Jan/feb 2025 for left hip    Motion sickness  Does use scopolamine patches as needed. Is going on cruise soon     History/Other:   Fall Risk Assessment:  She has been screened for Falls and is low risk.      Cognitive Assessment:   She had a completely normal cognitive assessment - see flowsheet entries     Functional Ability/Status:   Yuliana Torres has some abnormal functions as listed below:  She has Vision problems based on screening of functional status.       Depression Screening (PHQ):  PHQ-2 SCORE: 0  , done 11/7/2024        Advanced Directives:   She does NOT have a Living Will. [Do you have a living will?: (Patient-Rptd) No]  She does have a POA but we do NOT have it on file in Epic.    Discussed Advance Care Planning with patient (and family/surrogate if present). Standard forms made available to patient in After Visit Summary.      Patient Active Problem List   Diagnosis    Cervical intraepithelial neoplasia grade 1    Osteoarthritis of both hips     Multiple thyroid nodules    Orthopedic aftercare    Pulmonary nodule 1 cm or greater in diameter     Allergies:  She has No Known Allergies.    Current Medications:  Outpatient Medications Marked as Taking for the 24 encounter (Office Visit) with Cecilia Barnett MD   Medication Sig    Scopolamine 1.5mg TD patch 1mg/3days Place 1 patch onto the skin.    BANOPHEN 25 MG Oral Cap Take 1 capsule (25 mg total) by mouth nightly as needed for Sleep. AT BEDTIME    acetaminophen 500 MG Oral Tab Take 1 tablet (500 mg total) by mouth every 6 (six) hours as needed for Pain.       Medical History:  She  has a past medical history of Arthritis (), Disorder of thyroid, Fractured bone (), Low grade squamous intraepith lesion on cytologic smear cervix (lgsil) (), Osteoarthritis, PONV (postoperative nausea and vomiting), Post-menopausal (), Post-menopausal bleeding (), and Visual impairment.  Surgical History:  She  has a past surgical history that includes tubal ligation (); colposcopy, cervix w/upper adjacent vagina; w/biopsy(s), cervix (); colposcopy, cervix w/upper adjacent vagina; w/biopsy(s), cervix (); laser surgery of cervix (); hysteroscopy (2015); colonoscopy; total hip replacement (Right, 2020); colonoscopy (2021); colonoscopy (N/A, 2021); hip replacement surgery (2020); and  (79, 2/10/82, 85, 6/3/91).   Family History:  Her family history includes Arrhythmia in her mother; Breast Cancer (age of onset: 45) in her maternal cousin female; Heart Attack in her mother; Heart Disorder in her mother; Hypertension in her mother; Stroke in her daughter.  Social History:  She  reports that she has never smoked. She has never used smokeless tobacco. She reports current alcohol use. She reports that she does not use drugs.    Tobacco:  She has never smoked tobacco.    CAGE Alcohol Screen:   CAGE screening score of 0 on 2024, showing low risk of  alcohol abuse.      Patient Care Team:  Layla Martinez DO as PCP - General (Family Medicine)  Rachel Cooney MD (OBSTETRICS & GYNECOLOGY)  Michelle Alegria PT as Physical Therapist (Physical Therapy)  Alyssa Calero PTA as Physical Therapy Assistant (Physical Therapy)    Review of Systems   Constitutional: Negative.    HENT: Negative.     Eyes: Negative.    Respiratory: Negative.     Cardiovascular: Negative.    Gastrointestinal: Negative.    Genitourinary: Negative.    Musculoskeletal: Negative.    Skin: Negative.    Neurological: Negative.    Psychiatric/Behavioral: Negative.           Objective:   Physical Exam  Vitals and nursing note reviewed.   Constitutional:       General: She is not in acute distress.     Appearance: Normal appearance. She is not ill-appearing.   HENT:      Head: Normocephalic and atraumatic.      Right Ear: Tympanic membrane normal. There is no impacted cerumen.      Left Ear: Tympanic membrane normal. There is no impacted cerumen.      Mouth/Throat:      Mouth: Mucous membranes are moist.      Pharynx: Oropharynx is clear. No oropharyngeal exudate or posterior oropharyngeal erythema.   Eyes:      General:         Right eye: No discharge.         Left eye: No discharge.      Extraocular Movements: Extraocular movements intact.      Pupils: Pupils are equal, round, and reactive to light.   Cardiovascular:      Rate and Rhythm: Normal rate and regular rhythm.      Heart sounds: Normal heart sounds. No murmur heard.     No friction rub. No gallop.   Pulmonary:      Effort: Pulmonary effort is normal.      Breath sounds: Normal breath sounds. No wheezing, rhonchi or rales.   Abdominal:      General: Abdomen is flat. Bowel sounds are normal. There is no distension.      Palpations: Abdomen is soft. There is no mass.      Tenderness: There is no abdominal tenderness. There is no guarding or rebound.   Musculoskeletal:         General: Normal range of motion.      Cervical back: Normal  range of motion.      Right lower leg: No edema.      Left lower leg: No edema.   Skin:     General: Skin is warm and dry.      Findings: No rash.   Neurological:      General: No focal deficit present.      Mental Status: She is alert. Mental status is at baseline.   Psychiatric:         Mood and Affect: Mood normal.         Behavior: Behavior normal.           /72   Pulse 80   Ht 5' 6\" (1.676 m)   Wt 188 lb (85.3 kg)   LMP 08/14/2012 (Exact Date)   SpO2 99%   BMI 30.34 kg/m²  Estimated body mass index is 30.34 kg/m² as calculated from the following:    Height as of this encounter: 5' 6\" (1.676 m).    Weight as of this encounter: 188 lb (85.3 kg).    Medicare Hearing Assessment:  Hearing Screening    Screening Method: Finger Rub  Finger Rub Result: Pass         Visual Acuity:   Right Eye Visual Acuity: Corrected Right Eye Chart Acuity: 20/20   Left Eye Visual Acuity: Corrected Left Eye Chart Acuity: 20/25   Both Eyes Visual Acuity: Corrected Both Eyes Chart Acuity: 20/20   Able To Tolerate Visual Acuity: Yes        Assessment & Plan:   Yuliana Torres is a 64 year old female who presents for a Medicare Assessment.     1. Medicare annual wellness visit, initial (Primary)    -had patient sign GREG for mammogram records    2. Asymptomatic menopausal state  -     Dexa Bone Density (CPT=77080); Future; Expected date: 11/07/2024    -patient is supplementing with cod liver oil which does contain vitamin D. No supplementing with calcium    3. Visit for screening mammogram  -had patient sign GREG for records    4. Screening for cervical cancer  -     ThinPrep PAP Smear B; Future; Expected date: 11/07/2024  -     Hpv High Risk , Thin Prep Collect; Future; Expected date: 11/07/2024    -collected. Given normal paps will likely be patient's last pap    5. Multiple thyroid nodules  -     New Patient, Detailed, Low Complexity (44867)    -had patient sign GREG as has had US in 2024    6. Pulmonary nodule 1 cm or greater  in diameter  -     New Patient, Detailed, Low Complexity (74073)      -had patient sign GREG as has had CT in 2024    7. Prediabetes  -     Hemoglobin A1C; Future; Expected date: 11/07/2024  -     New Patient, Detailed, Low Complexity (37803)    -stable. Ordered updated A1c    8. Need for influenza vaccination  -     High Dose Fluzone trivalent influenza, 65 yrs+ PFS [67463]    -administered by staff    9. Osteoarthritis of both hips, unspecified osteoarthritis type  -     New Patient, Detailed, Low Complexity (77497)    -patient will update provider if needs referral to ortho    10. Primary insomnia  -     New Patient, Detailed, Low Complexity (08416)    Stable doing well on banofen. Will refill when needed    11. Motion sickness, sequela  -     Scopolamine; Place 1 patch onto the skin every third day.  Dispense: 24 patch; Refill: 1  -     New Patient, Detailed, Low Complexity (41763)    -well controlled on copolamine when goes on a cruise. Refilled      The patient indicates understanding of these issues and agrees to the plan.  Reinforced healthy diet, lifestyle, and exercise.      No follow-ups on file.     Cecilia Barnett MD, 11/7/2024     Supplementary Documentation:   General Health:  In the past six months, have you lost more than 10 pounds without trying?: (Patient-Rptd) 2 - No  Has your appetite been poor?: (Patient-Rptd) No  Type of Diet: (Patient-Rptd) Balanced  How does the patient maintain a good energy level?: (Patient-Rptd) Appropriate Exercise  How would you describe your daily physical activity?: (Patient-Rptd) Moderate  How would you describe your current health state?: (Patient-Rptd) Good  How do you maintain positive mental well-being?: (Patient-Rptd) Social Interaction;Visiting Friends;Visiting Family  On a scale of 0 to 10, with 0 being no pain and 10 being severe pain, what is your pain level?: (Patient-Rptd) 6 - (Moderate)  In the past six months, have you experienced urine leakage?:  (Patient-Rptd) 0-No  At any time do you feel concerned for the safety/well-being of yourself and/or your children, in your home or elsewhere?: (Patient-Rptd) No  Have you had any immunizations at another office such as Influenza, Hepatitis B, Tetanus, or Pneumococcal?: (Patient-Rptd) Yes    Health Maintenance   Topic Date Due    Mammogram  01/06/2023    MA Annual Health Assessment  Never done    DEXA Scan  Never done    Pneumococcal Vaccine: 65+ Years (1 of 1 - PCV) Never done    COVID-19 Vaccine (4 - 2023-24 season) 09/01/2024    Influenza Vaccine (1) 10/01/2024    Pap Smear  01/18/2025    Colorectal Cancer Screening  07/28/2028    Annual Depression Screening  Completed    Fall Risk Screening (Annual)  Completed    Zoster Vaccines  Completed

## 2024-11-08 ENCOUNTER — TELEPHONE (OUTPATIENT)
Facility: CLINIC | Age: 64
End: 2024-11-08

## 2024-11-08 LAB
EST. AVERAGE GLUCOSE BLD GHB EST-MCNC: 117 MG/DL (ref 68–126)
HBA1C MFR BLD: 5.7 % (ref ?–5.7)
HPV E6+E7 MRNA CVX QL NAA+PROBE: NEGATIVE

## 2024-11-08 NOTE — TELEPHONE ENCOUNTER
Approved    Prior authorization approved  Payer: [facility] Case ID: [id number]    [phone]    [phone]  Approval Details    Authorized from April 1, 2024 to December 31, 2025  Electronic appeal: Not supported  View History

## 2024-11-08 NOTE — TELEPHONE ENCOUNTER
[id number]_[id number]  Medication:Scopolamine 1.5mg TD patch 1mg/3days     Prior auth are completed for patches  Await outcome

## 2024-11-25 ENCOUNTER — PATIENT MESSAGE (OUTPATIENT)
Facility: CLINIC | Age: 64
End: 2024-11-25

## 2024-11-26 NOTE — TELEPHONE ENCOUNTER
Called over to [facility]. They will not send records with out GREG. Called pt and pt will try to get records sent to us

## 2024-12-05 ENCOUNTER — HOSPITAL ENCOUNTER (OUTPATIENT)
Dept: BONE DENSITY | Facility: HOSPITAL | Age: 64
Discharge: HOME | End: 2024-12-05
Attending: FAMILY MEDICINE
Payer: MEDICARE

## 2024-12-05 DIAGNOSIS — Z78.0 ASYMPTOMATIC MENOPAUSAL STATE: ICD-10-CM

## 2024-12-05 PROBLEM — M85.852 OSTEOPENIA OF LEFT HIP: Status: ACTIVE | Noted: 2024-12-05

## 2024-12-05 PROCEDURE — 77080 DXA BONE DENSITY AXIAL: CPT | Performed by: FAMILY MEDICINE

## 2025-01-22 ENCOUNTER — HOSPITAL ENCOUNTER (OUTPATIENT)
Age: 65
Discharge: ANOTHER HOSPITAL | End: 2025-01-22
Payer: MEDICARE

## 2025-01-22 ENCOUNTER — NURSE TRIAGE (OUTPATIENT)
Facility: CLINIC | Age: 65
End: 2025-01-22

## 2025-01-22 ENCOUNTER — HOSPITAL ENCOUNTER (EMERGENCY)
Facility: HOSPITAL | Age: 65
Discharge: HOME | End: 2025-01-22
Attending: EMERGENCY MEDICINE
Payer: MEDICARE

## 2025-01-22 ENCOUNTER — APPOINTMENT (OUTPATIENT)
Dept: CT IMAGING | Facility: HOSPITAL | Age: 65
End: 2025-01-22
Attending: EMERGENCY MEDICINE
Payer: MEDICARE

## 2025-01-22 VITALS
BODY MASS INDEX: 29.73 KG/M2 | RESPIRATION RATE: 15 BRPM | OXYGEN SATURATION: 98 % | HEIGHT: 66 IN | TEMPERATURE: 98 F | SYSTOLIC BLOOD PRESSURE: 135 MMHG | WEIGHT: 185 LBS | DIASTOLIC BLOOD PRESSURE: 90 MMHG | HEART RATE: 77 BPM

## 2025-01-22 VITALS
OXYGEN SATURATION: 99 % | HEART RATE: 72 BPM | DIASTOLIC BLOOD PRESSURE: 84 MMHG | RESPIRATION RATE: 16 BRPM | TEMPERATURE: 98 F | SYSTOLIC BLOOD PRESSURE: 127 MMHG

## 2025-01-22 DIAGNOSIS — R42 DIZZINESS: Primary | ICD-10-CM

## 2025-01-22 DIAGNOSIS — R42 VERTIGO: ICD-10-CM

## 2025-01-22 DIAGNOSIS — Z87.828 HISTORY OF HEAD INJURY: ICD-10-CM

## 2025-01-22 LAB
ANION GAP SERPL CALC-SCNC: 8 MMOL/L (ref 0–18)
ATRIAL RATE: 75 BPM
BASOPHILS # BLD AUTO: 0.03 X10(3) UL (ref 0–0.2)
BASOPHILS NFR BLD AUTO: 0.4 %
BUN BLD-MCNC: 12 MG/DL (ref 9–23)
BUN/CREAT SERPL: 13.6 (ref 10–20)
CALCIUM BLD-MCNC: 10.5 MG/DL (ref 8.7–10.4)
CHLORIDE SERPL-SCNC: 106 MMOL/L (ref 98–112)
CO2 SERPL-SCNC: 27 MMOL/L (ref 21–32)
CREAT BLD-MCNC: 0.88 MG/DL
DEPRECATED RDW RBC AUTO: 43 FL (ref 35.1–46.3)
EGFRCR SERPLBLD CKD-EPI 2021: 73 ML/MIN/1.73M2 (ref 60–?)
EOSINOPHIL # BLD AUTO: 0.01 X10(3) UL (ref 0–0.7)
EOSINOPHIL NFR BLD AUTO: 0.1 %
ERYTHROCYTE [DISTWIDTH] IN BLOOD BY AUTOMATED COUNT: 13 % (ref 11–15)
GLUCOSE BLD-MCNC: 104 MG/DL (ref 70–99)
HCT VFR BLD AUTO: 43.3 %
HGB BLD-MCNC: 14.1 G/DL
IMM GRANULOCYTES # BLD AUTO: 0.01 X10(3) UL (ref 0–1)
IMM GRANULOCYTES NFR BLD: 0.1 %
LYMPHOCYTES # BLD AUTO: 1.27 X10(3) UL (ref 1–4)
LYMPHOCYTES NFR BLD AUTO: 18.2 %
MCH RBC QN AUTO: 29.5 PG (ref 26–34)
MCHC RBC AUTO-ENTMCNC: 32.6 G/DL (ref 31–37)
MCV RBC AUTO: 90.6 FL
MONOCYTES # BLD AUTO: 0.24 X10(3) UL (ref 0.1–1)
MONOCYTES NFR BLD AUTO: 3.4 %
NEUTROPHILS # BLD AUTO: 5.42 X10 (3) UL (ref 1.5–7.7)
NEUTROPHILS # BLD AUTO: 5.42 X10(3) UL (ref 1.5–7.7)
NEUTROPHILS NFR BLD AUTO: 77.8 %
OSMOLALITY SERPL CALC.SUM OF ELEC: 292 MOSM/KG (ref 275–295)
P AXIS: 44 DEGREES
P-R INTERVAL: 172 MS
PLATELET # BLD AUTO: 306 10(3)UL (ref 150–450)
POCT INFLUENZA A: NEGATIVE
POCT INFLUENZA B: NEGATIVE
POTASSIUM SERPL-SCNC: 4.6 MMOL/L (ref 3.5–5.1)
Q-T INTERVAL: 396 MS
QRS DURATION: 76 MS
QTC CALCULATION (BEZET): 442 MS
R AXIS: 49 DEGREES
RBC # BLD AUTO: 4.78 X10(6)UL
SODIUM SERPL-SCNC: 141 MMOL/L (ref 136–145)
T AXIS: 42 DEGREES
VENTRICULAR RATE: 75 BPM
WBC # BLD AUTO: 7 X10(3) UL (ref 4–11)

## 2025-01-22 PROCEDURE — 85025 COMPLETE CBC W/AUTO DIFF WBC: CPT | Performed by: EMERGENCY MEDICINE

## 2025-01-22 PROCEDURE — 80048 BASIC METABOLIC PNL TOTAL CA: CPT | Performed by: EMERGENCY MEDICINE

## 2025-01-22 PROCEDURE — 96361 HYDRATE IV INFUSION ADD-ON: CPT

## 2025-01-22 PROCEDURE — 99285 EMERGENCY DEPT VISIT HI MDM: CPT

## 2025-01-22 PROCEDURE — 70450 CT HEAD/BRAIN W/O DYE: CPT | Performed by: EMERGENCY MEDICINE

## 2025-01-22 PROCEDURE — 96374 THER/PROPH/DIAG INJ IV PUSH: CPT

## 2025-01-22 PROCEDURE — 99215 OFFICE O/P EST HI 40 MIN: CPT | Performed by: PHYSICIAN ASSISTANT

## 2025-01-22 PROCEDURE — 87502 INFLUENZA DNA AMP PROBE: CPT | Performed by: PHYSICIAN ASSISTANT

## 2025-01-22 PROCEDURE — 93010 ELECTROCARDIOGRAM REPORT: CPT

## 2025-01-22 PROCEDURE — 93005 ELECTROCARDIOGRAM TRACING: CPT

## 2025-01-22 PROCEDURE — S0119 ONDANSETRON 4 MG: HCPCS | Performed by: PHYSICIAN ASSISTANT

## 2025-01-22 RX ORDER — DIAZEPAM 2 MG/1
2 TABLET ORAL 3 TIMES DAILY PRN
Qty: 9 TABLET | Refills: 0 | Status: SHIPPED | OUTPATIENT
Start: 2025-01-22 | End: 2025-01-25

## 2025-01-22 RX ORDER — ONDANSETRON 4 MG/1
4 TABLET, ORALLY DISINTEGRATING ORAL ONCE
Status: COMPLETED | OUTPATIENT
Start: 2025-01-22 | End: 2025-01-22

## 2025-01-22 RX ORDER — MECLIZINE HYDROCHLORIDE 25 MG/1
25 TABLET ORAL 3 TIMES DAILY PRN
Qty: 15 TABLET | Refills: 0 | Status: SHIPPED | OUTPATIENT
Start: 2025-01-22 | End: 2025-01-27

## 2025-01-22 RX ORDER — DIAZEPAM 2 MG/1
2 TABLET ORAL ONCE
Status: COMPLETED | OUTPATIENT
Start: 2025-01-22 | End: 2025-01-22

## 2025-01-22 RX ORDER — PROCHLORPERAZINE MALEATE 10 MG
10 TABLET ORAL EVERY 6 HOURS PRN
Qty: 20 TABLET | Refills: 0 | Status: SHIPPED | OUTPATIENT
Start: 2025-01-22 | End: 2025-01-27

## 2025-01-22 RX ORDER — MECLIZINE HYDROCHLORIDE 25 MG/1
25 TABLET ORAL ONCE
Status: COMPLETED | OUTPATIENT
Start: 2025-01-22 | End: 2025-01-22

## 2025-01-22 RX ORDER — PROCHLORPERAZINE EDISYLATE 5 MG/ML
5 INJECTION INTRAMUSCULAR; INTRAVENOUS ONCE
Status: COMPLETED | OUTPATIENT
Start: 2025-01-22 | End: 2025-01-22

## 2025-01-22 NOTE — ED QUICK NOTES
Pt unable to tolerate EKG at this time, increased nausea and dizziness when laying flat.     Will medicate with Zofran ODT and discharge to [facility] ED

## 2025-01-22 NOTE — ED INITIAL ASSESSMENT (HPI)
Pt presents to ED for dizziness and nausea. Symptoms started last night. Went to [facility] and dx with vertigo and told to come to ED for CT scan.

## 2025-01-22 NOTE — ED PROVIDER NOTES
Patient Seen in: [facility] Emergency Department    History     Chief Complaint   Patient presents with    Dizziness     Stated Complaint: N/V, vertigo     HPI    64-year-old female without past medical history presenting presenting for evaluation of dizziness described as room spinning sensation particularly with head movement and laying flat associated with nausea/vomiting addition to loose stools.  Of note, patient status post head injury from 6 days ago without loss of consciousness.  No anticoagulant or antiplatelet use.  No vision changes or focal weakness/paresthesias.    Past Medical History:    Arthritis    Osteoporosis    Disorder of thyroid    nodules, benign    Fractured bone    Tail bone     Low grade squamous intraepith lesion on cytologic smear cervix (lgsil)    Osteoarthritis    PONV (postoperative nausea and vomiting)    Post-menopausal    Post-menopausal bleeding    Visual impairment       Past Surgical History:   Procedure Laterality Date    Colonoscopy      Colonoscopy  2021    Colonoscopy N/A 2021    Procedure: COLONOSCOPY, POSSIBLE BIOPSY, POSSIBLE POLYPECTOMY 01372;  Surgeon: Heladio Nicole MD;  Location: [facility], [facility]    Colposcopy, cervix w/upper adjacent vagina; w/biopsy(s), cervix      Colposcopy, cervix w/upper adjacent vagina; w/biopsy(s), cervix      Hip replacement surgery  2020    Hysteroscopy  2015    D&C     Laser surgery of cervix  1988    cone bx      79, 2/10/82, 85, 6/3/91    4 children    Total hip replacement Right 2020    Tubal ligation              Family History   Problem Relation Age of Onset    Hypertension Mother     Heart Attack Mother     Arrhythmia Mother         atrial fibrillation    Heart Disorder Mother         Congenital Heart Failure    Stroke Daughter         Caused by brain bleed    Breast Cancer Maternal Cousin Female 45       Social History     Socioeconomic History    Marital status:     Occupational History    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[employer]   Tobacco Use    Smoking status: Never    Smokeless tobacco: Never   Vaping Use    Vaping status: Never Used   Substance and Sexual Activity    Alcohol use: Yes     Comment: Social drinker    Drug use: Never    Sexual activity: Not Currently   Other Topics Concern    Caffeine Concern No    Exercise Yes    Seat Belt Yes    Special Diet No    Stress Concern No    Weight Concern Yes    Blood Transfusions No     Social Drivers of Health     Financial Resource Strain: Low Risk  (2/28/2023)    Received from [facility]    Overall Financial Resource Strain (CARDIA)     Difficulty of Paying Living Expenses: Not hard at all   Food Insecurity: No Food Insecurity (3/6/2024)    Received from [facility]    Food Insecurity     Within the past 30 days, I worried whether my food would run out before I got money to buy more. / En los últimos 30 días, me preocupó que la comida se podía acabar antes de tener dinero para compr...: Never true / Nunca     Within the past 30 days, the food that I bought just didn't last, and I didn't have money to get more. / En los últimos 30 días, La comida que compré no rindió lo suficiente, y no tenía dinero para...: Never true / Nunca    Received from [facility]    Social Connections   Housing Stability: Low Risk  (2/28/2023)    Received from [facility]    Housing Stability Vital Sign     Unable to Pay for Housing in the Last Year: No     Number of Places Lived in the Last Year: 1     Unstable Housing in the Last Year: No       Review of Systems :  Constitutional: As per HPI  Eyes: Negative for discharge and visual disturbance.   Respiratory: Negative for cough and shortness of breath.    Cardiovascular: Negative for chest pain and palpitations.   Gastrointestinal: Negative for vomiting and abdominal pain.   Neurological: Negative for syncope and headaches.   Positive for dizziness.      Positive for stated complaint: N/V, vertigo  Other systems are as noted in HPI.  Constitutional and vital signs reviewed.      All other systems reviewed and negative except as noted above.    PSFH elements reviewed from today and agreed except as otherwise stated in HPI.    Physical Exam     ED Triage Vitals [01/22/25 1219]   /89   Pulse 73   Resp 16   Temp 97.8 °F (36.6 °C)   Temp src Temporal   SpO2 98 %   O2 Device None (Room air)       Current:/89   Pulse 73   Temp 97.8 °F (36.6 °C) (Temporal)   Resp 16   Ht 167.6 cm (5' 6\")   Wt 83.9 kg   LMP 08/14/2012 (Exact Date)   SpO2 98%   BMI 29.86 kg/m²         Physical Exam   Constitutional: No distress.   HEENT: MMM.  Head: Normocephalic.  Atraumatic.  No temporal tenderness.  Eyes: No injection.  Pupils midrange and equal reactive.  Full/painless extraocular movements with horizontal nystagmus.  Neck: Neck supple.  No meningismus.  Cardiovascular: RRR.   Pulmonary/Chest: Effort normal. CTAB.  Abdominal: Soft.  Nontender.  Musculoskeletal: No gross deformity.  Neurological: Alert.  Cranial nerves II through XII grossly intact. Bilateral upper and lower extremities with 5/5 strength proximally and distally.  Skin: Skin is warm.   Psychiatric: Cooperative.  Nursing note and vitals reviewed.        ED Course     Labs Reviewed   BASIC METABOLIC PANEL (8) - Abnormal; Notable for the following components:       Result Value    Glucose 104 (*)     Calcium, Total 10.5 (*)     All other components within normal limits   CBC WITH DIFFERENTIAL WITH PLATELET      EKG    Rate, intervals and axes as noted on EKG Report.  Rate: 75   Rhythm: NSR   Reading: NSR 75 without ZACARIAS as independently interpreted by myself        CT BRAIN OR HEAD (CPT=70450)    Result Date: 1/22/2025  PROCEDURE: CT BRAIN OR HEAD (CPT=70450)  COMPARISON: [facility], CT BRAIN OR HEAD (CPT=70450), 4/01/2021, 8:37 PM.  INDICATIONS: Dizziness and  nausea.  TECHNIQUE: CT images were obtained without contrast material.  Automated exposure control for dose reduction was used.  Dose information is transmitted to the ACR (American College of Radiology) NRDR (National Radiology Data Registry) which includes the Dose Index Registry.  FINDINGS:  CEREBRUM: No edema, hemorrhage, mass or acute infarct. CEREBELLUM: No edema, hemorrhage, mass or acute infarct. BRAINSTEM: No edema, hemorrhage, mass or acute infarct. CSF SPACES: No hydrocephalus, subarachnoid hemorrhage, or mass.  SKULL: Skull base and calvarium are intact. SINUSES: Limited views demonstrate no significant mucosal thickening or fluid.  ORBITS: Limited views are unremarkable.  OTHER: Atherosclerotic calcification cavernous carotid and vertebral arteries.          CONCLUSION:  1. No acute intracranial finding. 2. Large vessel intracranial atherosclerosis.    Dictated by (CST): Fitz Hunt MD on 1/22/2025 at 4:04 PM     Finalized by (CST): Fitz Hunt MD on 1/22/2025 at 4:05 PM                  MDM   DIFFERENTIAL DIAGNOSIS: After history and physical exam differential diagnosis includes but is not limited to intracranial hemorrhage/contusion, electrolyte derangement, vertigo, CVA.    Pulse ox: 98%:Normal on RA, as independently interpreted by myself    Medical Decision Making  Evaluation for positional dizziness described as spinning sensation without neurologic deficits and horizontal nystagmus with recent head injury; CT imaging nonacute, will discharge with symptomatic care and ongoing outpatient follow-up including physical therapy referral for vestibular therapy.    Problems Addressed:  Dizziness: acute illness or injury  History of head injury: undiagnosed new problem with uncertain prognosis  Vertigo: acute illness or injury    Amount and/or Complexity of Data Reviewed  External Data Reviewed: notes.     Details: IC notes from prior to arrival reviewed  Labs: ordered. Decision-making details  documented in ED Course.  Radiology: ordered and independent interpretation performed. Decision-making details documented in ED Course.     Details: CTH without obvious ICH as independently interpreted by myself        I was wearing at minimum a facemask and eye protection throughout this encounter with handwashing performed prior and after patient evaluation without personal hand/facial/oropharyngeal contact and gloves worn throughout encounter. See note and/or contact this provider for further PPE details.    Disposition and Plan     Clinical Impression:  1. Dizziness    2. History of head injury    3. Vertigo        Disposition:  Discharge    Follow-up:  Layla Martinez DO  [address] [place] [address]  [facility] [address]  [phone]    Call  For followup and re-evaluation.    [facility]  [address] [facility] [id number] [phone]  Call  For physical therapy followup and ongoing monitoring/management of your dizziness/vertigo.      Medications Prescribed:  Discharge Medication List as of 1/22/2025  4:27 PM        START taking these medications    Details   meclizine 25 MG Oral Tab Take 1 tablet (25 mg total) by mouth 3 (three) times daily as needed for Dizziness., Normal, Disp-15 tablet, R-0      diazePAM 2 MG Oral Tab Take 1 tablet (2 mg total) by mouth 3 (three) times daily as needed (For dizziness.)., Normal, Disp-9 tablet, R-0      prochlorperazine (COMPAZINE) 10 mg tablet Take 1 tablet (10 mg total) by mouth every 6 (six) hours as needed for Nausea., Normal, Disp-20 tablet, R-0

## 2025-01-22 NOTE — ED INITIAL ASSESSMENT (HPI)
Pt presents with \"room spilling\" symptoms started last evening. Nausea+.     Pt reports emesis x 2 last night.     Pt reports vertigo when laying flat.     Pt reports emesis this morning at 6a. Continued vertigo.     Pt reports striking right side of top of head on wooden cabinet 5-6 days ago, no LOC. Pt does not take blood thinners.

## 2025-01-22 NOTE — ED QUICK NOTES
Pt cleared by MD for discharge. Belongings with patient. Patient instructions reviewed. Pt A&ox4.

## 2025-01-22 NOTE — ED PROVIDER NOTES
Patient Seen in: [facility]      History     Chief Complaint   Patient presents with    Vertigo    Head Injury     Stated Complaint: DIZZY, EMESIS, HEADACHE    Subjective:   HPI      Patient is a 64-year-old female that presents to immediate care due to dizziness x 2 days.  Patient states 3 days ago she bumped her head on a cabinet.  Denies falling to the ground or LOC at that moment.  States that she had gradual onset right-sided headache.  States she woke up this morning with room spinning dizziness.  Notes multiple episodes of nonbloody emesis.  States dizziness is constant worse with movement.  Denies history of vertigo.  Denying chest pain shortness of breath palpitations, facial droop, extremity weakness.    Objective:     Past Medical History:    Arthritis    Osteoporosis    Disorder of thyroid    nodules, benign    Fractured bone    Tail bone     Low grade squamous intraepith lesion on cytologic smear cervix (lgsil)    Osteoarthritis    PONV (postoperative nausea and vomiting)    Post-menopausal    Post-menopausal bleeding    Visual impairment              Past Surgical History:   Procedure Laterality Date    Colonoscopy      Colonoscopy  2021    Colonoscopy N/A 2021    Procedure: COLONOSCOPY, POSSIBLE BIOPSY, POSSIBLE POLYPECTOMY 49922;  Surgeon: Heladio Nicole MD;  [place] [facility]    Colposcopy, cervix w/upper adjacent vagina; w/biopsy(s), cervix      Colposcopy, cervix w/upper adjacent vagina; w/biopsy(s), cervix      Hip replacement surgery  2020    Hysteroscopy  2015    D&C     Laser surgery of cervix  1988    cone bx      79, 2/10/82, 85, 6/3/91    4 children    Total hip replacement Right 2020    Tubal ligation                  Social History     Socioeconomic History    Marital status:    Occupational History    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asst   Tobacco Use    Smoking status: Never    Smokeless tobacco: Never    Vaping Use    Vaping status: Never Used   Substance and Sexual Activity    Alcohol use: Yes     Comment: Social drinker    Drug use: Never    Sexual activity: Not Currently   Other Topics Concern    Caffeine Concern No    Exercise Yes    Seat Belt Yes    Special Diet No    Stress Concern No    Weight Concern Yes    Blood Transfusions No     Social Drivers of Health     Financial Resource Strain: Low Risk  (2/28/2023)    Received from [facility]    Overall Financial Resource Strain (CARDIA)     Difficulty of Paying Living Expenses: Not hard at all   Food Insecurity: No Food Insecurity (3/6/2024)    Received from [facility]    Food Insecurity     Within the past 30 days, I worried whether my food would run out before I got money to buy more. / En los últimos 30 días, me preocupó que la comida se podía acabar antes de tener dinero para compr...: Never true / Nunca     Within the past 30 days, the food that I bought just didn't last, and I didn't have money to get more. / En los últimos 30 días, La comida que compré no rindió lo suficiente, y no tenía dinero para...: Never true / Nunca    Received from [facility]    Social Connections   Housing Stability: Low Risk  (2/28/2023)    Received from [facility]    Housing Stability Vital Sign     Unable to Pay for Housing in the Last Year: No     Number of Places Lived in the Last Year: 1     Unstable Housing in the Last Year: No              Review of Systems    Positive for stated complaint: DIZZY, EMESIS, HEADACHE  Other systems are as noted in HPI.  Constitutional and vital signs reviewed.      All other systems reviewed and negative except as noted above.    Physical Exam     ED Triage Vitals [01/22/25 1106]   /84   Pulse 72   Resp 16   Temp 98 °F (36.7 °C)   Temp src Oral   SpO2 99 %   O2 Device        Current Vitals:   Vital Signs  BP: 127/84  Pulse: 72  Resp: 16  Temp: 98 °F  (36.7 °C)  Temp src: Oral    Oxygen Therapy  SpO2: 99 %        Physical Exam  Vital signs reviewed. Nursing note reviewed.  Constitutional: Well-developed. Well-nourished. In no acute distress  HENT: Mucous membranes moist.   EYES: PERRL. EOMI. Visual fields in tact. Visual acuity grossly normal  NECK: Supple.   CARDIAC: Normal rate. Normal S1/ S2. 2+ distal pulses. No edema  PULM/CHEST: Clear to auscultation bilaterally. No wheezes  ABD: Soft, non-tender, non-distended  : No CVA tenderness.  RECTAL: deferred  Extremities: Full ROM  NEURO: Alert. CN II-XII intact. 5/5 strength in the upper and lower extremities. Sensation intact to light touch. Normal finger to nose test. Normal gait.   SKIN: Warm and dry. No rash or lesions.  PSYCH: Normal judgment. Normal affect.         ED Course     Labs Reviewed   POCT FLU TEST - Normal    Narrative:     This assay is a rapid molecular in vitro test utilizing nucleic acid amplification of influenza A and B viral RNA.     EKG                           MDM      Patient is a 64-year-old female that presents to immediate care due to 2 days of dizziness.  Patient arrives with stable vitals.  Physical exam showing patient neurovascularly intact.   Differential diagnosis include ICH, CVA, peripheral vertigo, BPPV, arrhythmia.  Attempted to perform EKG however patient unable to tolerate laying back on exam table. To prevent delay in care, defer EKG at this point and instruct patient to be seen at nearest emergency department for further evaluation and management.  Patient declined EMS transport stating her friend will drive her to [facility] emergency department at this time.  Patient treated with p.o. Zofran prior to transfer.  History given by patient.  Care discussed with attending Dr. Pierce        Medical Decision Making      Disposition and Plan     Clinical Impression:  1. Dizziness         Disposition:  Ic to ed  1/22/2025 11:23 am    Follow-up:  No follow-up provider  specified.        Medications Prescribed:  Discharge Medication List as of 1/22/2025 11:38 AM              Supplementary Documentation:

## 2025-01-22 NOTE — TELEPHONE ENCOUNTER
Please reply to pool: EM RN TRIAGE  Action Requested: Summary for Provider     []  Critical Lab, Recommendations Needed  [] Need Additional Advice  [x]   FYI    []   Need Orders  [] Need Medications Sent to Pharmacy  []  Other     SUMMARY: Patient contacts clinic reporting significant dizziness that began last night.  Worse when laying down.  Nausea and vomited x 1.  Feels weak.  Ears feel congested.  Denies fever.  Mild headache.  Denies chest pain or shortness of breath, no vision loss.  Immediate care evaluation recommended, patient agreed and will go.     Reason for call: Dizziness  Onset: Data Unavailable                       Reason for Disposition   Spinning or tilting sensation (vertigo) present now    Protocols used: Dizziness-A-OH

## 2025-01-24 ENCOUNTER — TELEPHONE (OUTPATIENT)
Facility: CLINIC | Age: 65
End: 2025-01-24

## 2025-01-24 ENCOUNTER — TELEMEDICINE (OUTPATIENT)
Facility: CLINIC | Age: 65
End: 2025-01-24
Payer: MEDICARE

## 2025-01-24 DIAGNOSIS — H81.12 BENIGN PAROXYSMAL POSITIONAL VERTIGO OF LEFT EAR: Primary | ICD-10-CM

## 2025-01-24 PROCEDURE — 99213 OFFICE O/P EST LOW 20 MIN: CPT | Performed by: FAMILY MEDICINE

## 2025-01-24 RX ORDER — MECLIZINE HYDROCHLORIDE 25 MG/1
25 TABLET ORAL 3 TIMES DAILY PRN
Qty: 30 TABLET | Refills: 1 | Status: SHIPPED | OUTPATIENT
Start: 2025-01-24

## 2025-01-24 NOTE — PROGRESS NOTES
CC:  No chief complaint on file.      HPI: Patient  presenting for video visit. Patient began to have sudden onset vertigo 3 days ago. Also began to have nausea and vomiting. Did have a BM at that time. Laying down triggers dizziness on left side. Patient did go to the urgent care who transferred patient to ER. CT head normal. Insurance will not cover diazepam. Patient has been taking meclizine which is helping.     ROS:  General:  No fever, no fatigue, no weight changes  HEENT:  Denies congestion or nasal discharge  Cardio:  No chest pain  Pulmonary:  No cough, no SOB  GI:  No N/V/D  :  No discharge, no dysuria, no polyuria, no hematuria  Dermatologic:  No rashes  Neuro: vertigo    Past Medical History:    Arthritis    Osteoporosis    Disorder of thyroid    nodules, benign    Fractured bone    Tail bone     Low grade squamous intraepith lesion on cytologic smear cervix (lgsil)    Osteoarthritis    PONV (postoperative nausea and vomiting)    Post-menopausal    Post-menopausal bleeding    Visual impairment       Social History     Socioeconomic History    Marital status:      Spouse name: Not on file    Number of children: Not on file    Years of education: Not on file    Highest education level: Not on file   Occupational History    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asst   Tobacco Use    Smoking status: Never    Smokeless tobacco: Never   Vaping Use    Vaping status: Never Used   Substance and Sexual Activity    Alcohol use: Yes     Comment: Social drinker    Drug use: Never    Sexual activity: Not Currently   Other Topics Concern    Caffeine Concern No    Exercise Yes    Seat Belt Yes    Special Diet No    Stress Concern No    Weight Concern Yes     Service Not Asked    Blood Transfusions No    Occupational Exposure Not Asked    Hobby Hazards Not Asked    Sleep Concern Not Asked    Back Care Not Asked    Bike Helmet Not Asked    Self-Exams Not Asked   Social History Narrative    Not on file     Social Drivers of  Health     Financial Resource Strain: Low Risk  (2/28/2023)    Received from [facility]    Overall Financial Resource Strain (CARDIA)     Difficulty of Paying Living Expenses: Not hard at all   Food Insecurity: No Food Insecurity (3/6/2024)    Received from [facility]    Food Insecurity     Within the past 30 days, I worried whether my food would run out before I got money to buy more. / En los últimos 30 días, me preocupó que la comida se podía acabar antes de tener dinero para compr...: Never true / Nunca     Within the past 30 days, the food that I bought just didn't last, and I didn't have money to get more. / En los últimos 30 días, La comida que compré no rindió lo suficiente, y no tenía dinero para...: Never true / Nunca   Transportation Needs: Not on file   Physical Activity: Not on file   Stress: Not on file   Social Connections: Unknown (3/18/2021)    Received from [facility]    Social Connections     Conversations with friends/family/neighbors per week: Not on file   Housing Stability: Low Risk  (2/28/2023)    Received from [facility]    Housing Stability Vital Sign     Unable to Pay for Housing in the Last Year: No     Number of Places Lived in the Last Year: 1     Unstable Housing in the Last Year: No       Current Outpatient Medications   Medication Sig Dispense Refill    meclizine 25 MG Oral Tab Take 1 tablet (25 mg total) by mouth 3 (three) times daily as needed for Dizziness. 15 tablet 0    diazePAM 2 MG Oral Tab Take 1 tablet (2 mg total) by mouth 3 (three) times daily as needed (For dizziness.). 9 tablet 0    prochlorperazine (COMPAZINE) 10 mg tablet Take 1 tablet (10 mg total) by mouth every 6 (six) hours as needed for Nausea. 20 tablet 0    BANOPHEN 25 MG Oral Cap Take 1 capsule (25 mg total) by mouth nightly as needed for Sleep. AT BEDTIME      Cod Liver Oil Oral Oil Take by mouth.      Collagen-Vitamin C-Biotin  (COLLAGEN 1500/C OR) Take by mouth.      Scopolamine 1.5mg TD patch 1mg/3days Place 1 patch onto the skin every third day. 24 patch 1    acetaminophen 500 MG Oral Tab Take 1 tablet (500 mg total) by mouth every 6 (six) hours as needed for Pain.         Patient has no known allergies.      Vitals: There were no vitals filed for this visit.      Physical:  General:  Alert, appropriate, no acute distress, A&O x 3  Pulmonary:  Speaking in clear and full sentences, no dyspnea   Psych: normal mood and affect     Assessment and Plan:     1. Benign paroxysmal positional vertigo of left ear  - meclizine 25 MG Oral Tab; Take 1 tablet (25 mg total) by mouth 3 (three) times daily as needed for Dizziness.  Dispense: 30 tablet; Refill: 1    Counseled patient to continue meclizine.  Sent in refill.  Also encourage patient to call to set up physical therapy for vestibular therapy.      Please note that the following visit was completed using two-way, real-time interactive audio and video communication. This has been done in good edith to provide continuity of care in the best interest of the provider-patient relationship, due to the ongoing public health crisis/national emergency and because of restrictions of visitation. There are limitations of this visit as no physical exam could be performed. Every conscious effort was taken to allow for sufficient and adequate time. This billing was spent on reviewing labs, medications, radiology tests and decision making. Appropriate medical decision-making and tests are ordered as detailed in the plan of care above.      Cecilia Barnett MD  01/24/25  11:05 AM

## 2025-01-24 NOTE — TELEPHONE ENCOUNTER
Can add on at 10:30 a.m. or 11:00 a.m. today for video visit. I am unaware of anything more to add aside from the meclizine that the ER also prescribed to patient.     Cecilia Barnett MD, 01/24/25, 10:09 AM

## 2025-01-24 NOTE — TELEPHONE ENCOUNTER
Patient contacted and agrees to 11 am video visit. Patient warm transferred to  staff member to assist with scheduling.

## 2025-01-24 NOTE — TELEPHONE ENCOUNTER
, are you able to assist or add patient for video visit?    Patient is asking if a different medication for vertigo can be prescribed.    Patient called and the diazepam that was ordered in the ER is not covered by insurance, Patient was seen in the ER 1/22/25 for dizziness/vertigo. Patient had head injury 8 days ago bumped her head on a cabinet.  Patient still having vertigo and has not been able to take the prescribed diazepam.

## 2025-02-20 ENCOUNTER — TELEPHONE (OUTPATIENT)
Dept: PHYSICAL THERAPY | Facility: HOSPITAL | Age: 65
End: 2025-02-20

## 2025-02-28 ENCOUNTER — OFFICE VISIT (OUTPATIENT)
Dept: PHYSICAL THERAPY | Facility: HOSPITAL | Age: 65
End: 2025-02-28
Attending: EMERGENCY MEDICINE
Payer: MEDICARE

## 2025-02-28 DIAGNOSIS — R42 VERTIGO: ICD-10-CM

## 2025-02-28 DIAGNOSIS — R42 DIZZINESS: Primary | ICD-10-CM

## 2025-02-28 PROCEDURE — 97161 PT EVAL LOW COMPLEX 20 MIN: CPT

## 2025-02-28 PROCEDURE — 95992 CANALITH REPOSITIONING PROC: CPT

## 2025-02-28 NOTE — PROGRESS NOTES
VESTIBULAR EVALUATION:   Referring Physician: Layla Martinez  Diagnosis: left posterior canal BPPV     Date of Service: 2/28/2025   Insurance:  Medicare      PATIENT SUMMARY   Yuliana Torres is a 64 year old female who presents to therapy today with reports of positional dizziness.  History/onset of current condition:  Pt. Was in her usual state of health about 1 month ago when she laid down to go to bed and had sudden onset spinning dizziness, nausea and vomiting.  Pt. Went to ED, dx with vertigo, prescribed odansetron and Meclizine. Pt. Reports more dizziness on left side.  Since that time, she has had intermittent dizziness with position changes, has been limiting movement and function to avoid positions that make her dizzy.    Symptoms with cough/sneeze or loud noise: No  Falls: No  Hx of migraines: No  Hx of vision issue: No  Hx of hearing issues: none    Dizziness: Current 0/10, Best 0/10, Worst 3/10  Quality: spinning  Frequency/Duration:  Brief in duration, nearly daily if exacerbated  Aggravates: Supine to/from sit, Rolling david to left, Bending over, Quick head movements and Looking/reaching up  Relieves: Not moving and Sitting down    Dizziness Handicap Inventory (DHI): 18/100 = mild symptoms    Current functional limitations include difficulty picking up objects from the floor, difficulty and symptomatic with lying flat or rolling in bed, limited community activity due to dizziness.   Social history: Lives alone, retired, part time 20 hours/week from home, busy with [employer] work, active in community.  Pt. Exercises at fitness center regularly.  Prior level of function: no limitations.  Pt goals include Decrease dizziness  Past medical history was reviewed. Significant findings include Arthritis (2018), Disorder of thyroid, Fractured bone (2008), Osteoarthritis, Post-menopausal (2012).     ASSESSMENT  Yuliana Torres presents to physical therapy evaluation with primary c/o positional dizziness. The  results of the objective tests and measures show positive for left posterior canal BPPV.  Pt. Was treated today with canalith repositioning for left posterior canal, once with +nausea but otherwise tolerated well.   Functional deficits include but are not limited to difficulty with lying flat or rolling over in bed, difficulty picking up objects from floor, reaching up onto high shelves.  PT discussed evaluation findings, pathology and management of BPPV, plan of care with pt.  Pt. Was issued written info re: BPPV, no other home exercise program is appropriate at this time.  Skilled Physical Therapy is medically necessary to address the above impairments and reach functional goals.    Precautions:  None  OBJECTIVE:   Physical Exam:  Posture/Observation: good posture, no assistive device   Neuro Screen: Sensation: WNL for light touch screen, no reports of paresthesias.    Coordination Testing:   Finger to Nose: WNL   Pronation/supination: WNL   Toe tapping: WNL     Cervical spine ROM: WNL  Adverse neuro signs with ROM: no     Oculomotor & Vestibular Exam:  Spontaneous Nystagmus: room light: none ;  fixation blocked: none  Smooth Pursuit: Negative  Saccades: Negative  Gaze Evoked Nystagmus:  room light: Negative; fixation blocked: Negative  Head Thrust: Negative  VOR screen:  WNL no dizziness    VOR Cancellation: Negative   Convergence: Negative  Cover/Uncover:  Negative  Cross Cover:  Negative  Head Shaking Nystagmus: Negative  Dynamic Visual Acuity:  Not Tested    Positional Testing:   Arabella-Hallpike: Right:negative, no symptoms    Left:positive strong left torsional upbeating nystagmus, 5 sec latency, 20 sec duration, + symptoms  Roll Test (HC): NT  Canalith repositioning maneuver for left posterior canal BPPV x  1, moderate nausea but tolerated ok. Applied cervical cold pack after repositioning to ameliorate nausea. Unable to tolerate second CRM due to nausea.    Postural Control:   TBA as appropriate    Functional  Mobility:   Gait: pt ambulates on level ground with normal mechanics.   Functional Gait Assessment (FGA): TBA as appropriate   Five Times Sit to Stand Test: TBA as appropriate     Today's Treatment and Response:   Pt education was provided on exam findings, treatment diagnosis, treatment plan, expectations, and prognosis. Pt was also provided recommendations for cervical cold pack as needed for nausea, detrimental fear avoidance behaviors, importance of remaining active and possible dizziness after evaluation. Patient was issued written information regarding the pathology and management of BPPV.  No other home exercise program is appropriate at this time.    Charges: PT Eval Low Complexity, CRM      Total Timed Treatment: 50 min     Total Treatment Time: 50 min     PLAN OF CARE:    Goals: (to be met in 8 visits)  1.  Negative Swanzey-Hallpike and roll tests.  2.  Able to perform position changes such as supine to/from sit and bending over to the floor without dizziness to improve safety in functional tasks.  3.  Pt. Able to ambulate with horizontal head turns for visual scanning without path deviation or dizziness to improve safety during gait.    Frequency / Duration: Patient will be seen for 1-2 x/week or a total of 8 visits over a 90 day period. Treatment will include: home exercise program development and instruction, patient/family education, balance training, canalith repositioning maneuver and eye/head coordination exercises.     Education or treatment limitation: None   Rehab Potential: good     Patient was advised of these findings, precautions, and treatment options and has agreed to actively participate in planning and for this course of care.     Thank you for your referral. Please co-sign or sign and return this letter via fax as soon as possible to [phone]. If you have any questions, please contact me at Dept: [phone].    Sincerely,    Sridevi Medrano, PT, NCS    Electronically signed by  therapist: Sridevi Medrano PT, [facility]  Physician's certification required: Yes  I certify the need for these services furnished under this plan of treatment and while under my care.    X___________________________________________________ Date____________________    Certification From: 2/28/2025  To:5/29/2025

## 2025-03-04 ENCOUNTER — OFFICE VISIT (OUTPATIENT)
Dept: PHYSICAL THERAPY | Facility: HOSPITAL | Age: 65
End: 2025-03-04
Attending: EMERGENCY MEDICINE
Payer: MEDICARE

## 2025-03-04 PROCEDURE — 95992 CANALITH REPOSITIONING PROC: CPT

## 2025-03-04 NOTE — PROGRESS NOTES
Patient: Yuliana Torres (64 year old, female) Referring Provider:  Insurance:   Diagnosis:   Dennis Esteves  AETNA MCR   Date of Surgery: No data recorded Next MD visit:  N/A   Precautions:    No data recorded Referral Information:    Date of Evaluation: Req: 0, Auth: 0, Exp:     No data recorded POC Auth Visits:  8       Today's Date   3/4/2025    Subjective  Pt. reports that she felt motion sick after last session, lasted 2 hours, then pt. felt better.  Pt. reports that evening she was able to lie down without dizziness.  Pt. reports that dizziness returned Sat night, duration 15 min, unable to stand, nauseous.  Pt's dizziness has persisted. Dizziness current: 0/10, this morning 3/10, on Sat night 8/10.       Pain: 0/10     Objective  see flow sheet      Rafael-Hallpike:           Assessment       Goals (to be met in 8 visits)   1.  Negative Rafael-Hallpike and roll tests.  2.  Able to perform position changes such as supine to/from sit and bending over to the floor without dizziness to improve safety in functional tasks.  3.  Pt. Able to ambulate with horizontal head turns for visual scanning without path deviation or dizziness to improve safety during gait.     Plan  Recheck positional testing and perform CRM as appropriate.    Treatment Last 4 Visits       3/4/2025   PT Treatment   Treatment Day 2          3/4/2025   Vestibular Treatment   Additional Treatment Rafael-Hallpike: positive left   Canalith Repositioning Minutes 40   Total Time Of Timed Procedures 0   Total Time Of Service-Based Procedures 40   Total Treatment Time 40        HEP       Charges  CRM

## 2025-03-05 NOTE — PROGRESS NOTES
Patient: Yuliana Torres (64 year old, female) Referring Provider:  Insurance:   Diagnosis: left posterior canal BPPV Dennis Esteves  AET[employer] MCR   Date of Surgery: N/A Next MD visit:  N/A   Precautions:  Fall Risk N/A Referral Information:    Date of Evaluation: Req: 0, Auth: 0, Exp:     No data recorded POC Auth Visits:  8       Today's Date   3/4/2025    Subjective  Pt. reports that she felt motion sick after last session, lasted 2 hours, then pt. felt better.  Pt. reports that evening she was able to lie down without dizziness.  Pt. reports that dizziness returned Sat night, duration 15 min, unable to stand, nauseous.  Pt's dizziness has persisted.       Pain: 0/10     Objective  see flow sheet          Assessment  Pt. positive for left posterior canal BPPV, with improved tolerance today, less symptoms overall.    Goals (to be met in 8 visits)   1.  Negative Rafael-Hallpike and roll tests.  2.  Able to perform position changes such as supine to/from sit and bending over to the floor without dizziness to improve safety in functional tasks.  3.  Pt. Able to ambulate with horizontal head turns for visual scanning without path deviation or dizziness to improve safety during gait.     Plan  Recheck positional testing and perform CRM as appropriate.    Treatment Last 4 Visits       3/4/2025   PT Treatment   Treatment Day 2          3/4/2025   Vestibular Treatment   Treatment Day 2   Additional Treatment Fixation blocked: no spontaneous or gaze-evoked nystagmus    Rafael-Hallpike: positive left torsional upbeating nystagmus, latency 5 sec, duration 15 sec, + symptoms  CRM x 2 for left posterior canal with good tolerance, min nausea that resolved.     Canalith Repositioning Minutes 40   Total Time Of Timed Procedures 0   Total Time Of Service-Based Procedures 40   Total Treatment Time 40        HEP       Charges  CRM

## 2025-03-11 ENCOUNTER — APPOINTMENT (OUTPATIENT)
Dept: PHYSICAL THERAPY | Facility: HOSPITAL | Age: 65
End: 2025-03-11
Attending: EMERGENCY MEDICINE
Payer: MEDICARE

## 2025-03-18 ENCOUNTER — APPOINTMENT (OUTPATIENT)
Dept: PHYSICAL THERAPY | Facility: HOSPITAL | Age: 65
End: 2025-03-18
Attending: EMERGENCY MEDICINE
Payer: MEDICARE

## 2025-03-25 ENCOUNTER — TELEPHONE (OUTPATIENT)
Dept: PHYSICAL THERAPY | Facility: HOSPITAL | Age: 65
End: 2025-03-25

## 2025-03-25 ENCOUNTER — APPOINTMENT (OUTPATIENT)
Dept: PHYSICAL THERAPY | Facility: HOSPITAL | Age: 65
End: 2025-03-25
Attending: EMERGENCY MEDICINE
Payer: MEDICARE

## 2025-04-04 ENCOUNTER — TELEPHONE (OUTPATIENT)
Facility: CLINIC | Age: 65
End: 2025-04-04

## 2025-04-04 DIAGNOSIS — Z12.31 BREAST CANCER SCREENING BY MAMMOGRAM: Primary | ICD-10-CM

## 2025-04-04 NOTE — TELEPHONE ENCOUNTER
Dr. Martinez, patient is requesting a mammogram order. Previous mammogram was completed 05/31/23. Order has been pended.

## 2025-04-24 ENCOUNTER — HOSPITAL ENCOUNTER (OUTPATIENT)
Dept: MAMMOGRAPHY | Facility: HOSPITAL | Age: 65
Discharge: HOME | End: 2025-04-24
Attending: FAMILY MEDICINE
Payer: MEDICARE

## 2025-04-24 DIAGNOSIS — Z12.31 BREAST CANCER SCREENING BY MAMMOGRAM: ICD-10-CM

## 2025-04-24 PROCEDURE — 77067 SCR MAMMO BI INCL CAD: CPT | Performed by: FAMILY MEDICINE

## 2025-04-24 PROCEDURE — 77063 BREAST TOMOSYNTHESIS BI: CPT | Performed by: FAMILY MEDICINE

## 2025-05-05 ENCOUNTER — OFFICE VISIT (OUTPATIENT)
Facility: CLINIC | Age: 65
End: 2025-05-05
Payer: MEDICARE

## 2025-05-05 VITALS
BODY MASS INDEX: 30.22 KG/M2 | DIASTOLIC BLOOD PRESSURE: 86 MMHG | HEART RATE: 74 BPM | SYSTOLIC BLOOD PRESSURE: 124 MMHG | OXYGEN SATURATION: 97 % | HEIGHT: 66 IN | WEIGHT: 188 LBS

## 2025-05-05 DIAGNOSIS — Z01.818 PREOP EXAMINATION: Primary | ICD-10-CM

## 2025-05-05 DIAGNOSIS — M16.12 PRIMARY OSTEOARTHRITIS OF LEFT HIP: ICD-10-CM

## 2025-05-05 DIAGNOSIS — E04.2 MULTIPLE THYROID NODULES: ICD-10-CM

## 2025-05-05 DIAGNOSIS — R73.03 PREDIABETES: ICD-10-CM

## 2025-05-05 DIAGNOSIS — R42 VERTIGO: ICD-10-CM

## 2025-05-05 PROBLEM — M16.0 OSTEOARTHRITIS OF BOTH HIPS: Status: RESOLVED | Noted: 2020-10-29 | Resolved: 2025-05-05

## 2025-05-05 PROCEDURE — 1160F RVW MEDS BY RX/DR IN RCRD: CPT | Performed by: FAMILY MEDICINE

## 2025-05-05 PROCEDURE — 1159F MED LIST DOCD IN RCRD: CPT | Performed by: FAMILY MEDICINE

## 2025-05-05 PROCEDURE — 99214 OFFICE O/P EST MOD 30 MIN: CPT | Performed by: FAMILY MEDICINE

## 2025-05-05 RX ORDER — IRON,CARB/VIT C/VIT B12/FOLIC 100-250-1
TABLET ORAL
COMMUNITY
Start: 2025-04-14

## 2025-05-05 NOTE — PROGRESS NOTES
Yuliana Torres is a 65 year old female.  Chief Complaint   Patient presents with    Pre-Op Exam     ARTHROPLASTY LEFT TOTAL HIP 25       HPI:   PREOP EXAM    PRE-OP Physical  What is the full name of procedure/ surgery? Left total hip arthroplasty  Date surgery or procedure is being done? 2025   What is the doctor’s full name  that is doing the surgery? Dr. Michael Reeves  What hospital or facility will the procedure occur? [facility]   Having surgery at [facility] with a doctor who is willing to work with Oriental orthodox patients in regards to not accepting blood transfusions.   She has been in a lot of pain in her left hip, and had the right hip replaced in . Taking one Ibuprofen and one Tylenol every two days. Also uses Salonpas.   Has had anesthesia several times in the past, and always gets vomiting after anesthesia. Otherwise no anesthesia complications or family history of malignant hyperthermia.   Has been having vertigo, and nothing is helping. Has been going to [facility], but it is not helping. It occurs only when she lays down on her left side.   ALLERGIES:  Allergies[1]    Immunization History   Administered Date(s) Administered    Covid-19 Vaccine Pfizer 30 mcg/0.3 ml 03/15/2021, 2021, 2021    Covid-19 Vaccine Pfizer Bivalent 30mcg/0.3mL 2022    Covid-19 Vaccine Pfizer Gokul-Sucrose 30 mcg/0.3 ml 2022, 2022    FLULAVAL 6 months & older 0.5 ml Prefilled syringe (12496) 2019, 2020, 2021    FLUZONE 6 months and older PFS 0.5 ml (99509) 2017, 2023    High Dose Fluzone Influenza Vaccine, 65yr+ PF 0.5mL (14684) 2024    Pfizer Covid-19 Vaccine 30mcg/0.3ml 12yrs+ 10/06/2023, 2024    TDAP 2017    Zoster Vaccine Recombinant Adjuvanted (Shingrix) 2021, 2022       Past Surgical History[2]  Family Status   Relation Status    Mo     Fa     Clarissa Alive    Mat Cous Fem (Not Specified)     NEG (Not Specified)     Family History[3]    Current Medications[4]   Past Medical History[5]   Social History:  Short Social Hx on File[6]     BP Readings from Last 6 Encounters:   05/05/25 124/86   01/22/25 135/90   01/22/25 127/84   11/07/24 126/72   01/18/22 126/80   12/30/21 124/76       Wt Readings from Last 6 Encounters:   05/05/25 188 lb (85.3 kg)   01/22/25 185 lb (83.9 kg)   11/07/24 188 lb (85.3 kg)   01/18/22 195 lb 9.6 oz (88.7 kg)   12/30/21 198 lb (89.8 kg)   04/12/21 185 lb (83.9 kg)       REVIEW OF SYSTEMS:   GENERAL HEALTH: feels well with no complaints   SKIN: denies any unusual skin lesions or rashes  RESPIRATORY: denies shortness of breath with exertion  CARDIOVASCULAR: denies chest pain on exertion  GI: denies abdominal pain and denies heartburn  NEURO: denies headaches, intermittent vertigo    EXAM:   /86   Pulse 74   Ht 5' 6\" (1.676 m)   Wt 188 lb (85.3 kg)   LMP 08/14/2012 (Exact Date)   SpO2 97%   BMI 30.34 kg/m²  Body mass index is 30.34 kg/m².    GENERAL: well developed, well nourished, in no apparent distress   SKIN: no rashes, no suspicious lesions  HEENT: atraumatic, normocephalic, ears and throat are clear  NECK: supple, no adenopathy, no bruits  LUNGS: clear to auscultation  CARDIO: RRR without murmur  GI: good bowel sounds, no masses, HSM or tenderness  EXTREMITIES: no cyanosis, clubbing or edema      ASSESSMENT AND PLAN:   Yuliana Torres is a 65 year old female presenting for a pre-operative physical.     1. Preop examination    -Patient is low risk for this intermediate risk procedure and medically cleared for surgery pending lab, EKG, urine, and MRSA swab results  -Avoid NSAID's, vitamins, and supplements for one week prior to surgery  - Comp Metabolic Panel (14); Future  - CBC With Differential With Platelet; Future  - Prothrombin Time (PT); Future  - PTT, Activated; Future  - Urinalysis with Culture Reflex; Future  - EKG 12 Lead Done at Hospital [EKG 1]; Future  -  MRSA Screen by PCR; Future  - Type and screen    2. Primary osteoarthritis of left hip    - Plans left total hip arthroplasty on May 19th    3. Prediabetes    - Hemoglobin A1C; Future    4. Vertigo    - Referral to neurologist given as she is still having vertigo despite normal CT head and vestibular physical therapy  - Neuro Referral - In Network    5. Multiple thyroid nodules    - Overdue for repeat thyroid ultrasound   - US THYROID (CPT=76536); Future          Layla MartinezDO  25  4:13 PM               [1] No Known Allergies  [2]   Past Surgical History:  Procedure Laterality Date    Colonoscopy      Colonoscopy  2021    Colonoscopy N/A 2021    Procedure: COLONOSCOPY, POSSIBLE BIOPSY, POSSIBLE POLYPECTOMY 78298;  Surgeon: Heladio Nicole MD;  [facility] [facility]    Colposcopy, cervix w/upper adjacent vagina; w/biopsy(s), cervix      Colposcopy, cervix w/upper adjacent vagina; w/biopsy(s), cervix      Hip replacement surgery  2020    Hysteroscopy  2015    D&C     Laser surgery of cervix      cone bx      79, 2/10/82, 85, 6/3/91    4 children    Total hip replacement Right 2020    Tubal ligation     [3]   Family History  Problem Relation Age of Onset    Hypertension Mother     Heart Attack Mother     Arrhythmia Mother         atrial fibrillation    Heart Disorder Mother         Congenital Heart Failure    Stroke Daughter         Caused by brain bleed    Breast Cancer Maternal Cousin Female 45    Ovarian Cancer Neg     Prostate Cancer Neg     Pancreatic Cancer Neg    [4]   Current Outpatient Medications   Medication Sig Dispense Refill    Iron-Vit C-Vit B12-Folic Acid (IRON 100 PLUS) 100-250-0.025-1 MG Oral Tab       meclizine 25 MG Oral Tab Take 1 tablet (25 mg total) by mouth 3 (three) times daily as needed for Dizziness. 30 tablet 1    BANOPHEN 25 MG Oral Cap Take 1 capsule (25 mg total) by mouth nightly as needed for Sleep. AT  BEDTIME      Cod Liver Oil Oral Oil Take by mouth.      Collagen-Vitamin C-Biotin (COLLAGEN 1500/C OR) Take by mouth.      acetaminophen 500 MG Oral Tab Take 1 tablet (500 mg total) by mouth every 6 (six) hours as needed for Pain.      Scopolamine 1.5mg TD patch 1mg/3days Place 1 patch onto the skin every third day. (Patient not taking: Reported on 5/5/2025) 24 patch 1   [5]   Past Medical History:   Arthritis    Osteoporosis    Disorder of thyroid    nodules, benign    Fractured bone    Tail bone     Low grade squamous intraepith lesion on cytologic smear cervix (lgsil)    Osteoarthritis    PONV (postoperative nausea and vomiting)    Post-menopausal    Post-menopausal bleeding    Visual impairment   [6]   Social History  Socioeconomic History    Marital status:    Occupational History    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[employer]   Tobacco Use    Smoking status: Never    Smokeless tobacco: Never   Vaping Use    Vaping status: Never Used   Substance and Sexual Activity    Alcohol use: Yes     Comment: Social drinker    Drug use: Never    Sexual activity: Not Currently   Other Topics Concern    Caffeine Concern No    Exercise No    Seat Belt No    Special Diet No    Stress Concern No    Weight Concern No    Blood Transfusions No     Social Drivers of Health     Food Insecurity: No Food Insecurity (4/2/2025)    Received from [facility]    Hunger Vital Sign     Worried About Running Out of Food in the Last Year: Never true     Ran Out of Food in the Last Year: Never true   Transportation Needs: No Transportation Needs (4/2/2025)    Received from [facility]    PRAPARE - Transportation     Lack of Transportation (Medical): No     Lack of Transportation (Non-Medical): No   Housing Stability: Unknown (4/2/2025)    Received from [facility]    Housing Stability Vital Sign     Unable to Pay for Housing in the Last Year: No

## 2025-05-06 ENCOUNTER — LAB ENCOUNTER (OUTPATIENT)
Dept: LAB | Age: 65
End: 2025-05-06
Attending: FAMILY MEDICINE
Payer: MEDICARE

## 2025-05-06 DIAGNOSIS — R73.03 PREDIABETES: ICD-10-CM

## 2025-05-06 DIAGNOSIS — Z01.818 PREOP EXAMINATION: ICD-10-CM

## 2025-05-06 LAB
ALBUMIN SERPL-MCNC: 4.2 G/DL (ref 3.2–4.8)
ALBUMIN/GLOB SERPL: 1.2 {RATIO} (ref 1–2)
ALP LIVER SERPL-CCNC: 73 U/L (ref 55–142)
ALT SERPL-CCNC: 9 U/L (ref 10–49)
ANION GAP SERPL CALC-SCNC: 6 MMOL/L (ref 0–18)
ANTIBODY SCREEN: NEGATIVE
APTT PPP: 27.7 SECONDS (ref 23–36)
AST SERPL-CCNC: 15 U/L (ref ?–34)
BASOPHILS # BLD AUTO: 0.07 X10(3) UL (ref 0–0.2)
BASOPHILS NFR BLD AUTO: 1.2 %
BILIRUB SERPL-MCNC: 0.4 MG/DL (ref 0.2–1.1)
BILIRUB UR QL: NEGATIVE
BUN BLD-MCNC: 16 MG/DL (ref 9–23)
BUN/CREAT SERPL: 16.5 (ref 10–20)
CALCIUM BLD-MCNC: 9.4 MG/DL (ref 8.7–10.4)
CHLORIDE SERPL-SCNC: 107 MMOL/L (ref 98–112)
CLARITY UR: CLEAR
CO2 SERPL-SCNC: 27 MMOL/L (ref 21–32)
CREAT BLD-MCNC: 0.97 MG/DL (ref 0.55–1.02)
DEPRECATED RDW RBC AUTO: 42.9 FL (ref 35.1–46.3)
EGFRCR SERPLBLD CKD-EPI 2021: 64 ML/MIN/1.73M2 (ref 60–?)
EOSINOPHIL # BLD AUTO: 0.16 X10(3) UL (ref 0–0.7)
EOSINOPHIL NFR BLD AUTO: 2.7 %
ERYTHROCYTE [DISTWIDTH] IN BLOOD BY AUTOMATED COUNT: 13.1 % (ref 11–15)
EST. AVERAGE GLUCOSE BLD GHB EST-MCNC: 114 MG/DL (ref 68–126)
FASTING STATUS PATIENT QL REPORTED: YES
GLOBULIN PLAS-MCNC: 3.5 G/DL (ref 2–3.5)
GLUCOSE BLD-MCNC: 81 MG/DL (ref 70–99)
GLUCOSE UR-MCNC: NORMAL MG/DL
HBA1C MFR BLD: 5.6 % (ref ?–5.7)
HCT VFR BLD AUTO: 40 % (ref 35–48)
HGB BLD-MCNC: 12.6 G/DL (ref 12–16)
HGB UR QL STRIP.AUTO: NEGATIVE
IMM GRANULOCYTES # BLD AUTO: 0.01 X10(3) UL (ref 0–1)
IMM GRANULOCYTES NFR BLD: 0.2 %
INR BLD: 0.93 (ref 0.8–1.2)
KETONES UR-MCNC: NEGATIVE MG/DL
LEUKOCYTE ESTERASE UR QL STRIP.AUTO: 25
LYMPHOCYTES # BLD AUTO: 2.86 X10(3) UL (ref 1–4)
LYMPHOCYTES NFR BLD AUTO: 48.4 %
MCH RBC QN AUTO: 27.9 PG (ref 26–34)
MCHC RBC AUTO-ENTMCNC: 31.5 G/DL (ref 31–37)
MCV RBC AUTO: 88.7 FL (ref 80–100)
MONOCYTES # BLD AUTO: 0.39 X10(3) UL (ref 0.1–1)
MONOCYTES NFR BLD AUTO: 6.6 %
MRSA DNA SPEC QL NAA+PROBE: NEGATIVE
NEUTROPHILS # BLD AUTO: 2.42 X10 (3) UL (ref 1.5–7.7)
NEUTROPHILS # BLD AUTO: 2.42 X10(3) UL (ref 1.5–7.7)
NEUTROPHILS NFR BLD AUTO: 40.9 %
NITRITE UR QL STRIP.AUTO: NEGATIVE
OSMOLALITY SERPL CALC.SUM OF ELEC: 290 MOSM/KG (ref 275–295)
PH UR: 5.5 [PH] (ref 5–8)
PLATELET # BLD AUTO: 311 10(3)UL (ref 150–450)
POTASSIUM SERPL-SCNC: 4 MMOL/L (ref 3.5–5.1)
PROT SERPL-MCNC: 7.7 G/DL (ref 5.7–8.2)
PROT UR-MCNC: NEGATIVE MG/DL
PROTHROMBIN TIME: 13 SECONDS (ref 11.6–14.8)
RBC # BLD AUTO: 4.51 X10(6)UL (ref 3.8–5.3)
RH BLOOD TYPE: POSITIVE
SODIUM SERPL-SCNC: 140 MMOL/L (ref 136–145)
SP GR UR STRIP: 1.02 (ref 1–1.03)
UROBILINOGEN UR STRIP-ACNC: NORMAL
WBC # BLD AUTO: 5.9 X10(3) UL (ref 4–11)

## 2025-05-06 PROCEDURE — 87086 URINE CULTURE/COLONY COUNT: CPT

## 2025-05-08 ENCOUNTER — EKG ENCOUNTER (OUTPATIENT)
Dept: LAB | Age: 65
End: 2025-05-08
Attending: FAMILY MEDICINE
Payer: MEDICARE

## 2025-05-08 ENCOUNTER — PATIENT MESSAGE (OUTPATIENT)
Facility: CLINIC | Age: 65
End: 2025-05-08

## 2025-05-08 DIAGNOSIS — Z01.818 PREOP EXAMINATION: ICD-10-CM

## 2025-05-08 LAB
ATRIAL RATE: 61 BPM
P AXIS: 32 DEGREES
P-R INTERVAL: 158 MS
Q-T INTERVAL: 412 MS
QRS DURATION: 86 MS
QTC CALCULATION (BEZET): 414 MS
R AXIS: 46 DEGREES
T AXIS: 34 DEGREES
VENTRICULAR RATE: 61 BPM

## 2025-05-08 PROCEDURE — 93005 ELECTROCARDIOGRAM TRACING: CPT

## 2025-05-08 PROCEDURE — 93010 ELECTROCARDIOGRAM REPORT: CPT | Performed by: INTERNAL MEDICINE

## 2025-06-13 ENCOUNTER — HOSPITAL ENCOUNTER (OUTPATIENT)
Dept: ULTRASOUND IMAGING | Age: 65
Discharge: HOME | End: 2025-06-13
Attending: FAMILY MEDICINE
Payer: MEDICARE

## 2025-06-13 DIAGNOSIS — E04.2 MULTIPLE THYROID NODULES: ICD-10-CM

## 2025-06-13 PROCEDURE — 76536 US EXAM OF HEAD AND NECK: CPT | Performed by: FAMILY MEDICINE

## (undated) DEVICE — Device

## (undated) DEVICE — 2DE14 2-0 PDO 24 X 24: Brand: 2DE14 2-0 PDO 24 X 24

## (undated) DEVICE — CHLORAPREP 26ML APPLICATOR

## (undated) DEVICE — GAUZE SPONGES,12 PLY: Brand: CURITY

## (undated) DEVICE — GAMMEX® PI HYBRID SIZE 8.5, STERILE POWDER-FREE SURGICAL GLOVE, POLYISOPRENE AND NEOPRENE BLEND: Brand: GAMMEX

## (undated) DEVICE — VIOLET BRAIDED (POLYGLACTIN 910), SYNTHETIC ABSORBABLE SUTURE: Brand: COATED VICRYL

## (undated) DEVICE — GAMMEX® NON-LATEX PI ORTHO SIZE 8.5, STERILE POLYISOPRENE POWDER-FREE SURGICAL GLOVE: Brand: GAMMEX

## (undated) DEVICE — 3M™ STERI-STRIP™ REINFORCED ADHESIVE SKIN CLOSURES, R1547, 1/2 IN X 4 IN (12 MM X 100 MM), 6 STRIPS/ENVELOPE: Brand: 3M™ STERI-STRIP™

## (undated) DEVICE — Device: Brand: STABLECUT®

## (undated) DEVICE — PREVENA PEEL & PLACE SYSTEM KIT- 13 CM: Brand: PREVENA™ PEEL & PLACE™

## (undated) DEVICE — HOOD: Brand: FLYTE

## (undated) DEVICE — ANTERIOR HIP: Brand: MEDLINE INDUSTRIES, INC.

## (undated) DEVICE — GAMMEX® PI HYBRID SIZE 9, STERILE POWDER-FREE SURGICAL GLOVE, POLYISOPRENE AND NEOPRENE BLEND: Brand: GAMMEX

## (undated) DEVICE — DRAPE SHEET LG

## (undated) DEVICE — SUTURE VICRYL 2-0 FS-1

## (undated) DEVICE — UNIT THERA PREVENA 45ML

## (undated) DEVICE — BIPOLAR SEALER 23-112-1 AQM 6.0: Brand: AQUAMANTYS™

## (undated) DEVICE — 2T11 #2 PDO 36 X 36: Brand: 2T11 #2 PDO 36 X 36

## (undated) DEVICE — SUCTION CANISTER, 3000CC,SAFELINER: Brand: DEROYAL

## (undated) DEVICE — GAMMEX® NON-LATEX PI ORTHO SIZE 9, STERILE POLYISOPRENE POWDER-FREE SURGICAL GLOVE: Brand: GAMMEX

## (undated) DEVICE — GOWN SRG XL IMPRV BRTHBL STRL

## (undated) DEVICE — SOL  .9 1000ML BTL

## (undated) DEVICE — PHOTONSABER Y METAL TIP

## (undated) NOTE — LETTER
12/11/2020          To Whom It May Concern:    Reno Torres is currently under my medical care. She is scheduled for a right total hip replacement on 12/29/2020. If you require additional information please contact our office.         Sincerely,    MEAGHAN

## (undated) NOTE — LETTER
7/15/2021  [address] [place] [id number]    Dear Mulu Saunders,     Here are the results from your recent testing :    During your colonoscopy a polyp was removed. The pathology showed that this polyp was an adenoma.   This kind of

## (undated) NOTE — LETTER
Patient Name: Nella Torres  YOB: 1960          MRN :  [id number]  Date:  5/5/2021  Referring Physician:  Ian Schmitz  Pt has attended 18 visits in Physical Therapy.    Dx:  Orthopedic aftercare (D08.72)

## (undated) NOTE — MR AVS SNAPSHOT
[address] at [address], [place] [address]  [phone]               Thank you for choosing us for your health care visit with Leela Santana DO.   We are glad to serve you and happy to provide you with th Breast cancer All women in this age group Yearly mammogram and clinical breast exam1   Cervical cancer All women in this age group, except women who have had a complete hysterectomy Pap test every 3 years or Pap test with human papillomavirus (HPV) test ev healthcare provider 2 doses given at least 6 months apart   Hepatitis B Women at increased risk for infection – talk with your healthcare provider 3 doses over 6 months; second dose should be given 1 month after the first dose; the third dose should be giv risk for cardiovascular health problems such as stroke When your risk is known   Use of tobacco and the health effects it can cause All women in this age group Every exam   [address]  Date Last Reviewed: 1/26/2016  © 5301-0923 [employer] Order:  Physical Therapy - Internal    [facility]   [address] [address]   Phone:  [phone]       Comment:  Treatment: Evaluate & Treat    Physician goals: Pain relief, Increased Function, Activities of daily living and Your physician has recommended you to have physical therapy done at [facility] however, your insurance company may require you to have these services done at another facility or to obtain an approved referral. Services ordered by your physi not sign up before the expiration date, you must request a new code. Your unique [employer] Access Code: [email or web address]  Expires: 4/14/2017 11:51 AM    If you have questions, you can call [phone] to talk to our [facility] Staff.  Remember, [facility]

## (undated) NOTE — LETTER
4/12/2021          To Whom It May Concern:    Norma Torres is currently under my medical care, No heavy lifting over 10 lbs, no excessive walking, sitting or stair climbing until next appointment.     If you require additional information please contact

## (undated) NOTE — LETTER
1/20/2021          To Whom It May Concern:    Norma Torres is currently under my medical care. She may return to work on 2/1/21 remotely. If you require additional information please contact our office.         Sincerely,    Francisco Castellanos,

## (undated) NOTE — LETTER
2/22/2021          To Whom It May Concern:    Nella Torres is currently under my medical care.   She may return to work on 3/1/21 with the following restrictions: no lifting greater than 10lbs, no  Prolonged walking, no prolonged sitting or standing unt

## (undated) NOTE — LETTER
Hospital Discharge Documentation  Please phone to schedule a hospital follow up appointment.     From: [address] [facility] Office  Phone: [phone]    Patient discharged time/date: 12/31/2020  4:34 PM  Patient discharge disposition:  [address] Abdominal: soft, non-tender; bowel sounds normal; no masses,  no organomegaly  Extremities: extremities normal, atraumatic, no cyanosis or edema  Psychiatric: calm        History of Present Illness:[FG.1]   Per Leslie Hunt Melissa is a(n) 63year old f Take 1 tablet (4 mg total) by mouth every 8 (eight) hours as needed for Nausea. Quantity: 14 tablet  Refills: 0        CONTINUE taking these medications      Instructions Prescription details   GLUCOSAMINE CHONDR COMPLEX OR      Take by mouth.    Refills

## (undated) NOTE — LETTER
10/22/21        [address] [address]      Dear Be Lozada,    [facility] records indicate that you have outstanding lab work and or testing that was ordered for you and has not yet been completed:  Orders Placed This Encounter

## (undated) NOTE — LETTER
[address] [place] [address]  Authorization for Surgical Operation or Procedure  Date: ______________    Time: _______________  1.  I hereby authorize Dr. Debbie Caicedo / Gurdeep Mccoy  , my physician and the assistant, to perform the following operati 5. I consent to the photographing of the operations or procedures to be performed for the purposes of advancing medicine, science, and/or education, provided my identity is not revealed.  If the procedure has been videotaped, the physician/surgeon will obta Statement of Physician: My signature below affirms that prior to the time of the procedure, I have explained to the patient and/or her guardian, the risks and benefits involved in the proposed treatment and any reasonable alternative to the proposed treatm

## (undated) NOTE — ED AVS SNAPSHOT
Luz Huitron [facility]   MRN: [id number]    Department:  [facility] Emergency Department   Date of Visit:  11/19/2019           Disclosure     Insurance plans vary and the physician(s) referred by the ER may not be covered by your plan.  Please contac CARE PHYSICIAN AT ONCE OR RETURN IMMEDIATELY TO THE EMERGENCY DEPARTMENT. If you have been prescribed any medication(s), please fill your prescription right away and begin taking the medication(s) as directed.   If you believe that any of the medications

## (undated) NOTE — Clinical Note
Procedure Date:12/29/20  D/c Date: 12/31/20  Procedure: Right total hip replacement, anterior approach  Surgeon: Dr. Sergio Feliciano

## (undated) NOTE — MR AVS SNAPSHOT
After Visit Summary   1/18/2022    Jacqueline Reveles [facility]   MRN: [id number]           Visit Information     Date & Time  1/18/2022  8:00 AM Provider  Sea Ansari, [address], [facility] Dept.  Phone  33 deliver the best patient experience and are looking for ways to make improvements. Your feedback will help us do so. For more information on [employer], please visit [email or web address]         No text in SmartText       No text in